# Patient Record
Sex: MALE | Race: WHITE | NOT HISPANIC OR LATINO
[De-identification: names, ages, dates, MRNs, and addresses within clinical notes are randomized per-mention and may not be internally consistent; named-entity substitution may affect disease eponyms.]

---

## 2017-01-18 ENCOUNTER — APPOINTMENT (OUTPATIENT)
Dept: SPINE | Facility: CLINIC | Age: 62
End: 2017-01-18

## 2017-01-18 VITALS
DIASTOLIC BLOOD PRESSURE: 90 MMHG | WEIGHT: 160 LBS | HEIGHT: 66 IN | SYSTOLIC BLOOD PRESSURE: 145 MMHG | BODY MASS INDEX: 25.71 KG/M2 | HEART RATE: 57 BPM

## 2017-01-18 DIAGNOSIS — Z87.891 PERSONAL HISTORY OF NICOTINE DEPENDENCE: ICD-10-CM

## 2017-01-18 DIAGNOSIS — Z78.9 OTHER SPECIFIED HEALTH STATUS: ICD-10-CM

## 2017-01-18 DIAGNOSIS — M51.26 OTHER INTERVERTEBRAL DISC DISPLACEMENT, LUMBAR REGION: ICD-10-CM

## 2017-01-18 DIAGNOSIS — Z85.9 PERSONAL HISTORY OF MALIGNANT NEOPLASM, UNSPECIFIED: ICD-10-CM

## 2017-01-18 RX ORDER — ALPRAZOLAM 1 MG/1
1 TABLET ORAL
Refills: 0 | Status: ACTIVE | COMMUNITY

## 2017-01-26 ENCOUNTER — TRANSCRIPTION ENCOUNTER (OUTPATIENT)
Age: 62
End: 2017-01-26

## 2017-11-28 ENCOUNTER — TRANSCRIPTION ENCOUNTER (OUTPATIENT)
Age: 62
End: 2017-11-28

## 2017-11-30 ENCOUNTER — TRANSCRIPTION ENCOUNTER (OUTPATIENT)
Age: 62
End: 2017-11-30

## 2017-12-05 ENCOUNTER — APPOINTMENT (OUTPATIENT)
Dept: OTOLARYNGOLOGY | Facility: CLINIC | Age: 62
End: 2017-12-05
Payer: COMMERCIAL

## 2017-12-05 VITALS
HEART RATE: 82 BPM | WEIGHT: 157 LBS | BODY MASS INDEX: 25.23 KG/M2 | DIASTOLIC BLOOD PRESSURE: 87 MMHG | HEIGHT: 66 IN | SYSTOLIC BLOOD PRESSURE: 148 MMHG

## 2017-12-05 DIAGNOSIS — Z85.09 PERSONAL HISTORY OF MALIGNANT NEOPLASM OF OTHER DIGESTIVE ORGANS: ICD-10-CM

## 2017-12-05 PROCEDURE — 99213 OFFICE O/P EST LOW 20 MIN: CPT

## 2017-12-05 RX ORDER — DICLOFENAC SODIUM 75 MG/1
75 TABLET, DELAYED RELEASE ORAL
Qty: 60 | Refills: 0 | Status: ACTIVE | COMMUNITY
Start: 2017-08-09

## 2017-12-05 RX ORDER — AZITHROMYCIN 250 MG/1
250 TABLET, FILM COATED ORAL
Qty: 6 | Refills: 0 | Status: DISCONTINUED | COMMUNITY
Start: 2017-11-29

## 2017-12-05 RX ORDER — OMEPRAZOLE 10 MG/1
10 CAPSULE, DELAYED RELEASE ORAL
Refills: 0 | Status: DISCONTINUED | COMMUNITY
End: 2017-12-05

## 2017-12-05 RX ORDER — VORTIOXETINE 5 MG/1
5 TABLET, FILM COATED ORAL
Qty: 30 | Refills: 0 | Status: ACTIVE | COMMUNITY
Start: 2017-12-01

## 2018-02-24 ENCOUNTER — TRANSCRIPTION ENCOUNTER (OUTPATIENT)
Age: 63
End: 2018-02-24

## 2018-06-17 ENCOUNTER — TRANSCRIPTION ENCOUNTER (OUTPATIENT)
Age: 63
End: 2018-06-17

## 2018-07-02 ENCOUNTER — TRANSCRIPTION ENCOUNTER (OUTPATIENT)
Age: 63
End: 2018-07-02

## 2018-07-22 ENCOUNTER — TRANSCRIPTION ENCOUNTER (OUTPATIENT)
Age: 63
End: 2018-07-22

## 2018-07-24 ENCOUNTER — NON-APPOINTMENT (OUTPATIENT)
Age: 63
End: 2018-07-24

## 2018-07-24 ENCOUNTER — APPOINTMENT (OUTPATIENT)
Dept: PULMONOLOGY | Facility: CLINIC | Age: 63
End: 2018-07-24
Payer: COMMERCIAL

## 2018-07-24 VITALS
SYSTOLIC BLOOD PRESSURE: 130 MMHG | DIASTOLIC BLOOD PRESSURE: 80 MMHG | WEIGHT: 171 LBS | HEART RATE: 62 BPM | OXYGEN SATURATION: 97 % | BODY MASS INDEX: 27.48 KG/M2 | HEIGHT: 66 IN

## 2018-07-24 DIAGNOSIS — Z82.49 FAMILY HISTORY OF ISCHEMIC HEART DISEASE AND OTHER DISEASES OF THE CIRCULATORY SYSTEM: ICD-10-CM

## 2018-07-24 DIAGNOSIS — Z86.79 PERSONAL HISTORY OF OTHER DISEASES OF THE CIRCULATORY SYSTEM: ICD-10-CM

## 2018-07-24 DIAGNOSIS — J94.2 HEMOTHORAX: ICD-10-CM

## 2018-07-24 DIAGNOSIS — Z78.9 OTHER SPECIFIED HEALTH STATUS: ICD-10-CM

## 2018-07-24 DIAGNOSIS — Z63.4 DISAPPEARANCE AND DEATH OF FAMILY MEMBER: ICD-10-CM

## 2018-07-24 DIAGNOSIS — J30.2 OTHER SEASONAL ALLERGIC RHINITIS: ICD-10-CM

## 2018-07-24 DIAGNOSIS — Z83.6 FAMILY HISTORY OF OTHER DISEASES OF THE RESPIRATORY SYSTEM: ICD-10-CM

## 2018-07-24 DIAGNOSIS — F41.9 ANXIETY DISORDER, UNSPECIFIED: ICD-10-CM

## 2018-07-24 DIAGNOSIS — F32.9 ANXIETY DISORDER, UNSPECIFIED: ICD-10-CM

## 2018-07-24 DIAGNOSIS — Z87.19 PERSONAL HISTORY OF OTHER DISEASES OF THE DIGESTIVE SYSTEM: ICD-10-CM

## 2018-07-24 PROCEDURE — 94010 BREATHING CAPACITY TEST: CPT | Mod: 59

## 2018-07-24 PROCEDURE — 71046 X-RAY EXAM CHEST 2 VIEWS: CPT

## 2018-07-24 PROCEDURE — 94618 PULMONARY STRESS TESTING: CPT

## 2018-07-24 PROCEDURE — 99204 OFFICE O/P NEW MOD 45 MIN: CPT | Mod: 25

## 2018-07-24 RX ORDER — OXYCODONE AND ACETAMINOPHEN 5; 325 MG/1; MG/1
5-325 TABLET ORAL
Qty: 30 | Refills: 0 | Status: ACTIVE | COMMUNITY
Start: 2018-06-19

## 2018-07-24 RX ORDER — AMLODIPINE BESYLATE 5 MG/1
5 TABLET ORAL
Qty: 15 | Refills: 0 | Status: ACTIVE | COMMUNITY
Start: 2018-02-19

## 2018-07-24 RX ORDER — SODIUM SULFATE, POTASSIUM SULFATE, MAGNESIUM SULFATE 17.5; 3.13; 1.6 G/ML; G/ML; G/ML
17.5-3.13-1.6 SOLUTION, CONCENTRATE ORAL
Qty: 354 | Refills: 0 | Status: DISCONTINUED | COMMUNITY
Start: 2018-05-17

## 2018-07-24 RX ORDER — OSELTAMIVIR PHOSPHATE 75 MG/1
75 CAPSULE ORAL
Qty: 10 | Refills: 0 | Status: DISCONTINUED | COMMUNITY
Start: 2018-02-19

## 2018-07-24 RX ORDER — OMEPRAZOLE 20 MG/1
20 CAPSULE, DELAYED RELEASE ORAL
Qty: 30 | Refills: 0 | Status: ACTIVE | COMMUNITY
Start: 2018-05-17

## 2018-07-24 RX ORDER — ONDANSETRON 4 MG/1
4 TABLET ORAL
Qty: 30 | Refills: 0 | Status: ACTIVE | COMMUNITY
Start: 2018-07-12

## 2018-07-24 SDOH — SOCIAL STABILITY - SOCIAL INSECURITY: DISSAPEARANCE AND DEATH OF FAMILY MEMBER: Z63.4

## 2018-07-26 PROBLEM — Z78.9 ALCOHOL USE: Status: ACTIVE | Noted: 2017-01-18

## 2018-09-11 ENCOUNTER — APPOINTMENT (OUTPATIENT)
Dept: PULMONOLOGY | Facility: CLINIC | Age: 63
End: 2018-09-11
Payer: COMMERCIAL

## 2018-09-11 VITALS
HEIGHT: 64 IN | RESPIRATION RATE: 17 BRPM | SYSTOLIC BLOOD PRESSURE: 126 MMHG | DIASTOLIC BLOOD PRESSURE: 80 MMHG | BODY MASS INDEX: 29.88 KG/M2 | WEIGHT: 175 LBS | OXYGEN SATURATION: 98 % | HEART RATE: 70 BPM

## 2018-09-11 DIAGNOSIS — J45.901 ACUTE BRONCHITIS, UNSPECIFIED: ICD-10-CM

## 2018-09-11 DIAGNOSIS — J20.9 ACUTE BRONCHITIS, UNSPECIFIED: ICD-10-CM

## 2018-09-11 PROCEDURE — 99214 OFFICE O/P EST MOD 30 MIN: CPT | Mod: 25

## 2018-09-11 PROCEDURE — 94727 GAS DIL/WSHOT DETER LNG VOL: CPT

## 2018-09-11 PROCEDURE — 95012 NITRIC OXIDE EXP GAS DETER: CPT

## 2018-09-11 PROCEDURE — 94010 BREATHING CAPACITY TEST: CPT

## 2018-09-11 PROCEDURE — 94729 DIFFUSING CAPACITY: CPT

## 2018-09-11 RX ORDER — AZITHROMYCIN 500 MG/1
500 TABLET, FILM COATED ORAL DAILY
Qty: 5 | Refills: 0 | Status: DISCONTINUED | COMMUNITY
Start: 2018-07-24 | End: 2018-09-11

## 2018-09-11 RX ORDER — AMOXICILLIN AND CLAVULANATE POTASSIUM 500; 125 MG/1; MG/1
500-125 TABLET, FILM COATED ORAL
Qty: 30 | Refills: 0 | Status: DISCONTINUED | COMMUNITY
Start: 2018-06-19 | End: 2018-09-11

## 2018-09-11 RX ORDER — AMOXICILLIN AND CLAVULANATE POTASSIUM 875; 125 MG/1; MG/1
875-125 TABLET, COATED ORAL
Qty: 60 | Refills: 0 | Status: DISCONTINUED | COMMUNITY
Start: 2018-06-28 | End: 2018-09-11

## 2018-09-11 RX ORDER — METHYLPREDNISOLONE 4 MG/1
4 TABLET ORAL
Qty: 21 | Refills: 0 | Status: DISCONTINUED | COMMUNITY
Start: 2018-07-22 | End: 2018-09-11

## 2018-09-11 RX ORDER — PREDNISONE 10 MG/1
10 TABLET ORAL
Qty: 100 | Refills: 0 | Status: DISCONTINUED | COMMUNITY
Start: 2018-07-24 | End: 2018-09-11

## 2018-09-11 NOTE — PHYSICAL EXAM
[General Appearance - Well Developed] : well developed [Normal Appearance] : normal appearance [Well Groomed] : well groomed [General Appearance - Well Nourished] : well nourished [No Deformities] : no deformities [General Appearance - In No Acute Distress] : no acute distress [Normal Conjunctiva] : the conjunctiva exhibited no abnormalities [Eyelids - No Xanthelasma] : the eyelids demonstrated no xanthelasmas [Normal Oropharynx] : normal oropharynx [II] : II [Neck Appearance] : the appearance of the neck was normal [Neck Cervical Mass (___cm)] : no neck mass was observed [Jugular Venous Distention Increased] : there was no jugular-venous distention [Thyroid Diffuse Enlargement] : the thyroid was not enlarged [Thyroid Nodule] : there were no palpable thyroid nodules [Heart Rate And Rhythm] : heart rate and rhythm were normal [Heart Sounds] : normal S1 and S2 [Murmurs] : no murmurs present [Respiration, Rhythm And Depth] : normal respiratory rhythm and effort [Exaggerated Use Of Accessory Muscles For Inspiration] : no accessory muscle use [Auscultation Breath Sounds / Voice Sounds] : lungs were clear to auscultation bilaterally [Abdomen Soft] : soft [Abdomen Tenderness] : non-tender [Abdomen Mass (___ Cm)] : no abdominal mass palpated [Abnormal Walk] : normal gait [Gait - Sufficient For Exercise Testing] : the gait was sufficient for exercise testing [Nail Clubbing] : no clubbing of the fingernails [Cyanosis, Localized] : no localized cyanosis [Petechial Hemorrhages (___cm)] : no petechial hemorrhages [Deep Tendon Reflexes (DTR)] : deep tendon reflexes were 2+ and symmetric [Sensation] : the sensory exam was normal to light touch and pinprick [No Focal Deficits] : no focal deficits [Oriented To Time, Place, And Person] : oriented to person, place, and time [Impaired Insight] : insight and judgment were intact [Affect] : the affect was normal [Skin Color & Pigmentation] : normal skin color and pigmentation [Skin Turgor] : normal skin turgor [] : no rash [FreeTextEntry1] : I:E 1:3, clear

## 2018-09-11 NOTE — REASON FOR VISIT
[Follow-Up] : a follow-up visit [FreeTextEntry1] : allergic rhinitis, cough, GERD, snoring, and SOB.

## 2018-09-11 NOTE — ADDENDUM
[FreeTextEntry1] : Documented by Kt Gu acting as a scribe for Dr. Pato Garcia on 9/11/18\par \par All medical record entries made by the Scribe were at my, Dr. Pato Garcia's, direction and personally dictated by me on 9/11/18. I have reviewed the chart and agree that the record accurately reflects my personal performance of the history, physical exam, assessment and plan. I have also personally directed, reviewed, and agree with the discharge instructions. \par \par \par \par \par

## 2018-09-11 NOTE — ASSESSMENT
[FreeTextEntry1] : Mr. Platt is a 63 year old male with a prior history of asthma, allergy, GERD, Casiano's esophagus, and ? OSAS, who now comes in for a pulmonary reevaluation. \par \par The patient's SOB is felt to be multifactorial:\par - Overweight\par - Out-of Shape\par - Poor breathing mechanics\par - Asthma/ Allergy\par \par Problem 1: Asthma \par - Continue Advair Diskus 250 1 inhalation BID\par - Continue rescue inhaler: Ventolin 2 puffs up to QID\par - Continue treatment of albuterol 1 unit does via a nebulizer up to TID-- He was given a nebulizer treatment today 7/24/2018\par Asthma is believed to be caused by inherited (genetic) and environmental factor, but its exact cause is unknown. Asthma may be triggered by allergens, lung infections, or irritants in the air. Asthma triggers are different for each person \par -Inhaler technique reviewed as well as oral hygiene techniques reviewed with patient. Avoidance of cold air, extremes of temperature, rescue inhaler should be used before exercise. Order of medication reviewed with patient. Recommended use of a cool mist humidifier in the bedroom. \par \par Problem 2: Allergic Rhinitis \par - Continue olopatadine .6% 1 sniff/nostril BID\par - Recommended Navage Nasal Saline \par - Blood work to include: asthma profile, food IgE panel, eosinophil level, IgE level, Vitamin D level \par - Environmental measures for allergies were encouraged including mattress and pillow cover, air purifier, and environmental controls.\par \par Problem 3: Reflux/ GERD\par - Add Omeprazole 40 mg before breakfast\par - Things to avoid including overeating, spicy foods, tight clothing, eating within three hours of bed, this list is not all inclusive. \par -For treatment of reflux, possible options discussed including diet control, H2 blockers, PPIs, as well as coating motility agents discussed as treatment options. Timing of meals and proximity of last meal to sleep were discussed. If symptoms persist, a formal gastrointestinal evaluation is needed.\par \par Problem 4: r/o NORMA\par - Based on his mallampatti class, EDS, and Casiano's esophagus, he is recommended to have a home sleep study to r/o for NORMA\par - Blood work to include thyroid function test and Free and Total Testosterone Test\par Sleep apnea is associated with adverse clinical consequences which an affect most organ systems. Cardiovascular disease risk includes arrhythmias, atrial fibrillation, hypertension, coronary artery disease, and stroke. Metabolic disorders include diabetes type 2, non-alcoholic fatty liver disease. Mood disorder especially depression; and cognitive decline especially in the elderly. Associations with chronic reflux/Casiano’s esophagus some but not all inclusive. \par -Reasons include arousal consistent with hypopnea; respiratory events most prominent in REM sleep or supine position; therefore sleep staging and body position are important for accurate diagnosis and estimation of AHI. \par \par Problem 5: Cardiac \par - follow up with cardiologist \par \par Problem 6: Poor Breathing Mechanics \par - Proper breathing techniques were reviewed with an emphasis of exhalation. Patient instructed to breath in for 1 second and out for four seconds. Patient was encouraged to not talk while walking. \par \par Problem 7: Overweight\par - Weight loss, exercise, and diet control were discussed and are highly encouraged. Treatment options were given such as, aqua therapy, and contacting a nutritionist. Recommended to use the elliptical, stationary bike, less use of treadmill. Mindful eating was explained to the patient Obesity is associated with worsening asthma, shortness of breath, and potential for cardiac disease, diabetes, and other underlying medical conditions. \par \par  Problem 8: Health maintenance \par -s/p flu shot \par -recommended strep pneumonia vaccines: Prevnar-13 vaccine, followed by Pneumo vaccine 23 one year following\par -recommended early intervention for URIs\par -recommended regular osteoporosis evaluations\par -recommended early dermatological evaluations\par -recommended after the age of 50 to the age of 70, colonoscopy every 5 years \par \par f/u in 3-4 months \par pt is encouraged to call or fax the office with any questions or concerns. \par Explained to the pt in full detail with demonstrations how to use the inhalers and inhaler hygiene. \par -Education provided to the PT regarding their visit and conditions.

## 2018-09-11 NOTE — PROCEDURE
[FreeTextEntry1] : PFT- spi reveals normal flows; FEV1 was  2.56L which is 104% of predicted; normal lung volumes; normal diffusion at 18.7, which is 92% of predicted; normal flow volume loop \par \par FENO was 15; a normal value being less than 25\par Fractional exhaled nitric oxide (FENO) is regarded as a simple, noninvasive method for assessing eosinophilic airway inflammation. Produced by a variety of cells within the lung, nitric oxide (NO) concentrations are generally low in healthy individuals. However, high concentrations of NO appear to be involved in nonspecific host defense mechanisms and chronic inflammatory diseases such as asthma. The American Thoracic Society (ATS) therefore has recommended using FENO to aid in the diagnosis and monitoring of eosinophilic airway inflammation and asthma, and for identifying steroid responsive individuals whose chronic respiratory symptoms may be caused by airway inflammation. \par \par \par

## 2018-09-11 NOTE — HISTORY OF PRESENT ILLNESS
[FreeTextEntry1] : Mr. Platt is a 63 year old male coming into the office today for a follow up visit with a history of allergic rhinitis, cough, GERD, snoring, and SOB. His chief complaint is some nasal congestion\par - He has general headaches that he does not believe to be related to his sinuses.\par - His daughter has told him that he snores very loudly.\par - He states that he is getting up in the middle of the night. \par - His has gained some weight.\par - He continues to experiences acid reflux once per month due to his Casiano's \par - he recently began exercising again by doing push ups. \par - While he does not note any significant sinus issues, he has been experiencing some right-sided congestion, which he believes is caused by mold in his bathroom. \par - He denies any nausea, vomiting, fever, chills, sweats, chest pain, chest pressure, palpitations, SOB, coughing, wheezing, fatigue, diarrhea, constipation, dysphagia, myalgias, dizziness, leg swelling, leg pain, itchy eyes, itchy ears, heartburn, reflux, or sour taste in the mouth.

## 2018-09-25 ENCOUNTER — CLINICAL ADVICE (OUTPATIENT)
Age: 63
End: 2018-09-25

## 2019-01-07 ENCOUNTER — TRANSCRIPTION ENCOUNTER (OUTPATIENT)
Age: 64
End: 2019-01-07

## 2019-01-11 ENCOUNTER — NON-APPOINTMENT (OUTPATIENT)
Age: 64
End: 2019-01-11

## 2019-01-11 ENCOUNTER — APPOINTMENT (OUTPATIENT)
Dept: PULMONOLOGY | Facility: CLINIC | Age: 64
End: 2019-01-11
Payer: COMMERCIAL

## 2019-01-11 VITALS
RESPIRATION RATE: 17 BRPM | BODY MASS INDEX: 28.32 KG/M2 | TEMPERATURE: 98 F | SYSTOLIC BLOOD PRESSURE: 128 MMHG | WEIGHT: 170 LBS | OXYGEN SATURATION: 97 % | DIASTOLIC BLOOD PRESSURE: 64 MMHG | HEART RATE: 64 BPM | HEIGHT: 65 IN

## 2019-01-11 DIAGNOSIS — Z23 ENCOUNTER FOR IMMUNIZATION: ICD-10-CM

## 2019-01-11 PROCEDURE — 94010 BREATHING CAPACITY TEST: CPT

## 2019-01-11 PROCEDURE — 95012 NITRIC OXIDE EXP GAS DETER: CPT

## 2019-01-11 PROCEDURE — 99214 OFFICE O/P EST MOD 30 MIN: CPT | Mod: 25

## 2019-01-11 NOTE — ASSESSMENT
[FreeTextEntry1] : Mr. Platt is a 63 year old male with a prior history of asthma, allergy, GERD, Casiano's esophagus, and ? OSAS, who now comes in for a pulmonary reevaluation. His number one issue is poor sleep. \par \par The patient's SOB is felt to be multifactorial:\par - Overweight\par - Out-of Shape\par - Poor breathing mechanics\par - Asthma/ Allergy\par \par Problem 1: Asthma \par - Continue Advair Diskus 250 1 inhalation BID\par - Continue rescue inhaler: Ventolin 2 puffs up to QID\par - Continue treatment of albuterol 1 unit does via a nebulizer up to TID\par Asthma is believed to be caused by inherited (genetic) and environmental factor, but its exact cause is unknown. Asthma may be triggered by allergens, lung infections, or irritants in the air. Asthma triggers are different for each person \par -Inhaler technique reviewed as well as oral hygiene techniques reviewed with patient. Avoidance of cold air, extremes of temperature, rescue inhaler should be used before exercise. Order of medication reviewed with patient. Recommended use of a cool mist humidifier in the bedroom. \par \par Problem 2: Allergic Rhinitis \par - Continue olopatadine .6% 1 sniff/nostril BID\par - Recommended Navage Nasal Saline \par - Blood work to include: asthma profile, food IgE panel, eosinophil level, IgE level, Vitamin D level \par - Environmental measures for allergies were encouraged including mattress and pillow cover, air purifier, and environmental controls.\par \par Problem 3: Reflux/ GERD\par - Continue Omeprazole 40 mg before breakfast\par - Things to avoid including overeating, spicy foods, tight clothing, eating within three hours of bed, this list is not all inclusive. \par -For treatment of reflux, possible options discussed including diet control, H2 blockers, PPIs, as well as coating motility agents discussed as treatment options. Timing of meals and proximity of last meal to sleep were discussed. If symptoms persist, a formal gastrointestinal evaluation is needed.\par \par Problem 4: r/o NORMA\par - Based on his mallampatti class, EDS, and Casiano's esophagus, he is recommended to have a home sleep study to r/o for NORMA\par - Blood work to include thyroid function test and Free and Total Testosterone Test\par Sleep apnea is associated with adverse clinical consequences which an affect most organ systems. Cardiovascular disease risk includes arrhythmias, atrial fibrillation, hypertension, coronary artery disease, and stroke. Metabolic disorders include diabetes type 2, non-alcoholic fatty liver disease. Mood disorder especially depression; and cognitive decline especially in the elderly. Associations with chronic reflux/Casiano’s esophagus some but not all inclusive. \par -Reasons include arousal consistent with hypopnea; respiratory events most prominent in REM sleep or supine position; therefore sleep staging and body position are important for accurate diagnosis and estimation of AHI. \par \par Problem 5: Cardiac \par - follow up with cardiologist \par \par Problem 6: Poor Breathing Mechanics \par - Proper breathing techniques were reviewed with an emphasis of exhalation. Patient instructed to breath in for 1 second and out for four seconds. Patient was encouraged to not talk while walking. \par \par Problem 7: Overweight\par - Weight loss, exercise, and diet control were discussed and are highly encouraged. Treatment options were given such as, aqua therapy, and contacting a nutritionist. Recommended to use the elliptical, stationary bike, less use of treadmill. Mindful eating was explained to the patient Obesity is associated with worsening asthma, shortness of breath, and potential for cardiac disease, diabetes, and other underlying medical conditions. \par \par  Problem 8: Health maintenance \par - Administered an influenza vaccine today 1/11/2019\par -recommended strep pneumonia vaccines: Prevnar-13 vaccine, followed by Pneumo vaccine 23 one year following\par -recommended early intervention for URIs\par -recommended regular osteoporosis evaluations\par -recommended early dermatological evaluations\par -recommended after the age of 50 to the age of 70, colonoscopy every 5 years \par \par f/u in 3-4 months \par pt is encouraged to call or fax the office with any questions or concerns. \par Explained to the pt in full detail with demonstrations how to use the inhalers and inhaler hygiene. \par -Education provided to the PT regarding their visit and conditions.

## 2019-01-11 NOTE — PHYSICAL EXAM
[General Appearance - Well Developed] : well developed [Normal Appearance] : normal appearance [Well Groomed] : well groomed [General Appearance - Well Nourished] : well nourished [No Deformities] : no deformities [General Appearance - In No Acute Distress] : no acute distress [Normal Conjunctiva] : the conjunctiva exhibited no abnormalities [Eyelids - No Xanthelasma] : the eyelids demonstrated no xanthelasmas [Normal Oropharynx] : normal oropharynx [II] : II [Neck Appearance] : the appearance of the neck was normal [Neck Cervical Mass (___cm)] : no neck mass was observed [Jugular Venous Distention Increased] : there was no jugular-venous distention [Thyroid Diffuse Enlargement] : the thyroid was not enlarged [Thyroid Nodule] : there were no palpable thyroid nodules [Heart Rate And Rhythm] : heart rate and rhythm were normal [Heart Sounds] : normal S1 and S2 [Respiration, Rhythm And Depth] : normal respiratory rhythm and effort [Exaggerated Use Of Accessory Muscles For Inspiration] : no accessory muscle use [Auscultation Breath Sounds / Voice Sounds] : lungs were clear to auscultation bilaterally [Abdomen Soft] : soft [Abdomen Tenderness] : non-tender [Abdomen Mass (___ Cm)] : no abdominal mass palpated [Abnormal Walk] : normal gait [Gait - Sufficient For Exercise Testing] : the gait was sufficient for exercise testing [Nail Clubbing] : no clubbing of the fingernails [Cyanosis, Localized] : no localized cyanosis [Petechial Hemorrhages (___cm)] : no petechial hemorrhages [Deep Tendon Reflexes (DTR)] : deep tendon reflexes were 2+ and symmetric [Sensation] : the sensory exam was normal to light touch and pinprick [No Focal Deficits] : no focal deficits [Oriented To Time, Place, And Person] : oriented to person, place, and time [Impaired Insight] : insight and judgment were intact [Affect] : the affect was normal [Skin Color & Pigmentation] : normal skin color and pigmentation [Skin Turgor] : normal skin turgor [] : no rash [FreeTextEntry1] : I:E 1:3, clear

## 2019-01-11 NOTE — REASON FOR VISIT
[Follow-Up] : a follow-up visit [FreeTextEntry1] : allergic rhinitis, asthma, GERD, snoring, and SOB.

## 2019-01-11 NOTE — HISTORY OF PRESENT ILLNESS
[FreeTextEntry1] : Mr. Platt is a 63 year old male coming into the office today for a follow up visit with a history of allergic rhinitis, cough, GERD, snoring, and SOB. His chief complaint is poor sleep. \par - He comes in stating that he was ill the previous week with a virus\par - He woke up in the morning with severe GI pain. \par - He is currently improved\par - he denies constipation but notes that he has hard stool\par - He continues to have heartburn, reflux. \par - His sleep continues to be an issue. He feels that it is psychological. \par - He has recently decided to stop drinking Lawrence with half a pill of Xanax and has transitioned to drinking sleepy time tea with a full pill of Xanax. \par - He reports rhinorrhea\par - His right sinus always is congested completely. \par - He has requested a new prescription for a sleep apnea \par - His weight is stable\par - He denies any headaches, nausea, vomiting, fever, chills, sweats, chest pain, chest pressure, palpitations, SOB, coughing, wheezing, diarrhea, dysphagia, myalgias, dizziness, leg swelling, leg pain, itchy eyes, itchy ears, or sour taste in the mouth. \par

## 2019-01-11 NOTE — PROCEDURE
[FreeTextEntry1] : PFT- spi reveals normal flows; FEV1 was 2.29L which is 80% of predicted; normal flow volume loop \par \par FENO was 12; a normal value being less than 25\par Fractional exhaled nitric oxide (FENO) is regarded as a simple, noninvasive method for assessing eosinophilic airway inflammation. Produced by a variety of cells within the lung, nitric oxide (NO) concentrations are generally low in healthy individuals. However, high concentrations of NO appear to be involved in nonspecific host defense mechanisms and chronic inflammatory diseases such as asthma. The American Thoracic Society (ATS) therefore has recommended using FENO to aid in the diagnosis and monitoring of eosinophilic airway inflammation and asthma, and for identifying steroid responsive individuals whose chronic respiratory symptoms may be caused by airway inflammation.

## 2019-01-11 NOTE — ADDENDUM
[FreeTextEntry1] : Documented by Kt Gu acting as a scribe for Dr. Pato Garcia on 1/11/2019\par \par All medical record entries made by the Scribe were at my, Dr. Pato Garcia's, direction and personally dictated by me on 1/11/2019. I have reviewed the chart and agree that the record accurately reflects my personal performance of the history, physical exam, assessment and plan. I have also personally directed, reviewed, and agree with the discharge instructions. \par \par \par \par \par

## 2019-01-15 ENCOUNTER — MED ADMIN CHARGE (OUTPATIENT)
Age: 64
End: 2019-01-15

## 2019-01-15 PROBLEM — Z23 ENCOUNTER FOR IMMUNIZATION: Status: ACTIVE | Noted: 2019-01-15

## 2019-02-13 ENCOUNTER — APPOINTMENT (OUTPATIENT)
Dept: SLEEP CENTER | Facility: CLINIC | Age: 64
End: 2019-02-13

## 2019-05-13 ENCOUNTER — NON-APPOINTMENT (OUTPATIENT)
Age: 64
End: 2019-05-13

## 2019-05-13 ENCOUNTER — APPOINTMENT (OUTPATIENT)
Dept: PULMONOLOGY | Facility: CLINIC | Age: 64
End: 2019-05-13
Payer: MEDICARE

## 2019-05-13 VITALS
BODY MASS INDEX: 25.49 KG/M2 | HEART RATE: 66 BPM | SYSTOLIC BLOOD PRESSURE: 120 MMHG | HEIGHT: 65 IN | RESPIRATION RATE: 17 BRPM | WEIGHT: 153 LBS | OXYGEN SATURATION: 100 % | DIASTOLIC BLOOD PRESSURE: 70 MMHG

## 2019-05-13 PROCEDURE — 99214 OFFICE O/P EST MOD 30 MIN: CPT | Mod: 25

## 2019-05-13 PROCEDURE — 94010 BREATHING CAPACITY TEST: CPT

## 2019-05-13 RX ORDER — OLOPATADINE HYDROCHLORIDE 2 MG/ML
0.2 SOLUTION OPHTHALMIC
Qty: 3 | Refills: 1 | Status: ACTIVE | COMMUNITY
Start: 2019-05-13 | End: 1900-01-01

## 2019-05-13 RX ORDER — CEFUROXIME AXETIL 500 MG/1
500 TABLET ORAL
Qty: 20 | Refills: 0 | Status: DISCONTINUED | COMMUNITY
Start: 2018-06-17 | End: 2019-05-13

## 2019-05-13 NOTE — PHYSICAL EXAM
[General Appearance - Well Developed] : well developed [Well Groomed] : well groomed [General Appearance - Well Nourished] : well nourished [No Deformities] : no deformities [General Appearance - In No Acute Distress] : no acute distress [Normal Conjunctiva] : the conjunctiva exhibited no abnormalities [Eyelids - No Xanthelasma] : the eyelids demonstrated no xanthelasmas [Normal Oropharynx] : normal oropharynx [II] : II [Neck Appearance] : the appearance of the neck was normal [Neck Cervical Mass (___cm)] : no neck mass was observed [Jugular Venous Distention Increased] : there was no jugular-venous distention [Thyroid Diffuse Enlargement] : the thyroid was not enlarged [Thyroid Nodule] : there were no palpable thyroid nodules [Heart Rate And Rhythm] : heart rate and rhythm were normal [Heart Sounds] : normal S1 and S2 [Respiration, Rhythm And Depth] : normal respiratory rhythm and effort [Exaggerated Use Of Accessory Muscles For Inspiration] : no accessory muscle use [Auscultation Breath Sounds / Voice Sounds] : lungs were clear to auscultation bilaterally [Abdomen Soft] : soft [Abdomen Tenderness] : non-tender [Abdomen Mass (___ Cm)] : no abdominal mass palpated [Abnormal Walk] : normal gait [Gait - Sufficient For Exercise Testing] : the gait was sufficient for exercise testing [Nail Clubbing] : no clubbing of the fingernails [Cyanosis, Localized] : no localized cyanosis [Petechial Hemorrhages (___cm)] : no petechial hemorrhages [] : no ischemic changes [Deep Tendon Reflexes (DTR)] : deep tendon reflexes were 2+ and symmetric [Sensation] : the sensory exam was normal to light touch and pinprick [No Focal Deficits] : no focal deficits [Oriented To Time, Place, And Person] : oriented to person, place, and time [Impaired Insight] : insight and judgment were intact [Affect] : the affect was normal [FreeTextEntry1] : I:E 1:3, clear

## 2019-05-13 NOTE — PROCEDURE
[FreeTextEntry1] : PFT- spi reveals normal flows; FEV1 was 2.95L which is 104% of predicted; normal flow volume loop

## 2019-05-13 NOTE — HISTORY OF PRESENT ILLNESS
[FreeTextEntry1] : Mr. Platt is a 64 year old male coming into the office today for a follow up visit with a history of allergic rhinitis, cough, GERD, snoring, and SOB. His chief complaint is allergies. \par - He comes in having lost 14 pounds since 1/2019 and feeling generally well. \par - He has removed alcohol from his diet during the week. \par - He generally has not had any heartburn or reflux, but reports occasional incidents. \par - he has been having a lot of dry mouth second to Allegra D 12 hours, which he takes for allergies\par - He has been having more frequent BM, which he believes is due to his new, healthier diet. \par - He has been doing relatively well with anti-depression medication as well as anti-anxiety for sleep. \par - His bowels are regular \par - He  denies any headaches, nausea, vomiting, fever, chills, sweats, chest pain, chest pressure, palpitations, SOB, coughing, wheezing, fatigue, diarrhea, constipation, dysphagia, myalgias, dizziness, leg swelling, leg pain, itchy eyes, itchy ears, or sour taste in the mouth.

## 2019-05-13 NOTE — ASSESSMENT
[FreeTextEntry1] : Mr. Platt is a 63 year old male with a prior history of tongue cancer, asthma, allergy, GERD, Casiano's esophagus, and ? OSAS, who now comes in for a pulmonary reevaluation. His number one issue is poor sleep, though he is overall improved. \par \par The patient's SOB is felt to be multifactorial:\par - Overweight\par - Out-of Shape\par - Poor breathing mechanics\par - Asthma/ Allergy\par \par Problem 1: Asthma \par - Continue Advair Diskus 250 1 inhalation BID\par - Continue rescue inhaler: Ventolin 2 puffs up to QID\par - Continue treatment of albuterol 1 unit does via a nebulizer up to TID\par Asthma is believed to be caused by inherited (genetic) and environmental factor, but its exact cause is unknown. Asthma may be triggered by allergens, lung infections, or irritants in the air. Asthma triggers are different for each person \par -Inhaler technique reviewed as well as oral hygiene techniques reviewed with patient. Avoidance of cold air, extremes of temperature, rescue inhaler should be used before exercise. Order of medication reviewed with patient. Recommended use of a cool mist humidifier in the bedroom. \par \par Problem 2: Allergic Rhinitis \par - Continue olopatadine .6% 1 sniff/nostril BID\par - Recommended Navage Nasal Saline \par - Blood work to include: asthma profile, food IgE panel, eosinophil level, IgE level, Vitamin D level \par - Environmental measures for allergies were encouraged including mattress and pillow cover, air purifier, and environmental controls.\par \par Problem 3: Reflux/ GERD\par - Continue Omeprazole 40 mg before breakfast\par - Things to avoid including overeating, spicy foods, tight clothing, eating within three hours of bed, this list is not all inclusive. \par -For treatment of reflux, possible options discussed including diet control, H2 blockers, PPIs, as well as coating motility agents discussed as treatment options. Timing of meals and proximity of last meal to sleep were discussed. If symptoms persist, a formal gastrointestinal evaluation is needed.\par \par Problem 4: r/o NORMA\par - Based on his mallampatti class, EDS, and Casiano's esophagus, he is recommended to have a home sleep study to r/o for NORMA\par - Blood work to include thyroid function test and Free and Total Testosterone Test\par Sleep apnea is associated with adverse clinical consequences which an affect most organ systems. Cardiovascular disease risk includes arrhythmias, atrial fibrillation, hypertension, coronary artery disease, and stroke. Metabolic disorders include diabetes type 2, non-alcoholic fatty liver disease. Mood disorder especially depression; and cognitive decline especially in the elderly. Associations with chronic reflux/Casiano’s esophagus some but not all inclusive. \par -Reasons include arousal consistent with hypopnea; respiratory events most prominent in REM sleep or supine position; therefore sleep staging and body position are important for accurate diagnosis and estimation of AHI. \par \par Problem 5: Cardiac \par - follow up with cardiologist \par \par Problem 6: Poor Breathing Mechanics \par - Proper breathing techniques were reviewed with an emphasis of exhalation. Patient instructed to breath in for 1 second and out for four seconds. Patient was encouraged to not talk while walking. \par \par Problem 7: Overweight (improved)\par - Diet improved \par - Weight loss, exercise, and diet control were discussed and are highly encouraged. Treatment options were given such as, aqua therapy, and contacting a nutritionist. Recommended to use the elliptical, stationary bike, less use of treadmill. Mindful eating was explained to the patient Obesity is associated with worsening asthma, shortness of breath, and potential for cardiac disease, diabetes, and other underlying medical conditions. \par \par  Problem 8: Health maintenance \par - Administered an influenza vaccine today 1/11/2019\par -recommended strep pneumonia vaccines: Prevnar-13 vaccine, followed by Pneumo vaccine 23 one year following\par -recommended early intervention for URIs\par -recommended regular osteoporosis evaluations\par -recommended early dermatological evaluations\par -recommended after the age of 50 to the age of 70, colonoscopy every 5 years \par \par f/u in 3-4 months \par pt is encouraged to call or fax the office with any questions or concerns. \par Explained to the pt in full detail with demonstrations how to use the inhalers and inhaler hygiene. \par -Education provided to the PT regarding their visit and conditions.

## 2019-05-13 NOTE — ADDENDUM
[FreeTextEntry1] : Documented by Kt Gu acting as a scribe for Dr. Pato Garcia on 5/13/2019\par \par All medical record entries made by the Scribe were at my, Dr. Pato Garcia's, direction and personally dictated by me on 5/13/2019. I have reviewed the chart and agree that the record accurately reflects my personal performance of the history, physical exam, assessment and plan. I have also personally directed, reviewed, and agree with the discharge instructions. \par \par \par \par \par

## 2019-05-23 ENCOUNTER — TRANSCRIPTION ENCOUNTER (OUTPATIENT)
Age: 64
End: 2019-05-23

## 2019-05-28 ENCOUNTER — APPOINTMENT (OUTPATIENT)
Dept: OTOLARYNGOLOGY | Facility: CLINIC | Age: 64
End: 2019-05-28
Payer: MEDICARE

## 2019-05-28 VITALS
SYSTOLIC BLOOD PRESSURE: 169 MMHG | WEIGHT: 153 LBS | HEART RATE: 61 BPM | BODY MASS INDEX: 25.49 KG/M2 | HEIGHT: 65 IN | DIASTOLIC BLOOD PRESSURE: 94 MMHG

## 2019-05-28 DIAGNOSIS — E04.1 NONTOXIC SINGLE THYROID NODULE: ICD-10-CM

## 2019-05-28 DIAGNOSIS — K13.0 DISEASES OF LIPS: ICD-10-CM

## 2019-05-28 DIAGNOSIS — C02.9 MALIGNANT NEOPLASM OF TONGUE, UNSPECIFIED: ICD-10-CM

## 2019-05-28 PROCEDURE — 99214 OFFICE O/P EST MOD 30 MIN: CPT

## 2019-05-28 NOTE — REASON FOR VISIT
[Subsequent Evaluation] : a subsequent evaluation for [FreeTextEntry2] : lip lesion, h/o tongue cancer

## 2019-05-28 NOTE — PHYSICAL EXAM
[de-identified] : L thyroid nodule unchanged [de-identified] : Mild erythema [de-identified] : Lingual scar unchanged.   [Midline] : trachea located in midline position [de-identified] : No lip lesion identified on either lip. [Normal] : no rashes

## 2019-05-28 NOTE — CONSULT LETTER
[Dear  ___] : Dear  [unfilled], [Courtesy Letter:] : I had the pleasure of seeing your patient, [unfilled], in my office today. [Please see my note below.] : Please see my note below. [Consult Closing:] : Thank you very much for allowing me to participate in the care of this patient.  If you have any questions, please do not hesitate to contact me. [Sincerely,] : Sincerely, [FreeTextEntry2] : Rajesh Denney MD [FreeTextEntry3] : Pablo Simon MD, FACS\par Clinical \par Dept. of Otolaryngology\par [facility]\par

## 2019-05-28 NOTE — HISTORY OF PRESENT ILLNESS
[de-identified] : Mr. 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  He presents today reporting a lesion of the lower lip at the beginning of May.  He notes the lesion is much smaller than when he first noted it.  Denies any pain or discomfort. [Neck Mass] : no neck mass [Painful Swallowing] : no painful swallowing [Difficulty Swallowing] : no difficulty swallowing

## 2019-06-04 ENCOUNTER — RESULT REVIEW (OUTPATIENT)
Age: 64
End: 2019-06-04

## 2019-09-16 ENCOUNTER — APPOINTMENT (OUTPATIENT)
Dept: PULMONOLOGY | Facility: CLINIC | Age: 64
End: 2019-09-16
Payer: MEDICARE

## 2019-09-16 ENCOUNTER — NON-APPOINTMENT (OUTPATIENT)
Age: 64
End: 2019-09-16

## 2019-09-16 VITALS
BODY MASS INDEX: 25.33 KG/M2 | OXYGEN SATURATION: 100 % | HEART RATE: 67 BPM | DIASTOLIC BLOOD PRESSURE: 65 MMHG | WEIGHT: 152 LBS | HEIGHT: 65 IN | SYSTOLIC BLOOD PRESSURE: 130 MMHG | RESPIRATION RATE: 17 BRPM

## 2019-09-16 PROCEDURE — 99214 OFFICE O/P EST MOD 30 MIN: CPT | Mod: 25

## 2019-09-16 PROCEDURE — 95012 NITRIC OXIDE EXP GAS DETER: CPT

## 2019-09-16 PROCEDURE — 94010 BREATHING CAPACITY TEST: CPT

## 2019-09-16 NOTE — ASSESSMENT
[FreeTextEntry1] : Mr. Platt is a 64 year old male with a prior history of tongue cancer, asthma, allergy, GERD, Casiano's esophagus, and ? OSAS, who now comes in for a pulmonary reevaluation. His number one issue is depression (due to his wife's death) and poor sleep, though he is overall improved with weight loss.\par \par The patient's SOB is felt to be multifactorial:\par - Overweight\par - Out-of Shape\par - Poor breathing mechanics\par - Asthma/ Allergy\par \par Problem 1: Asthma \par - Continue Advair Diskus 250 1 inhalation BID\par - Continue rescue inhaler: Ventolin 2 puffs up to QID\par - Continue treatment of albuterol 1 unit does via a nebulizer up to TID\par Asthma is believed to be caused by inherited (genetic) and environmental factor, but its exact cause is unknown. Asthma may be triggered by allergens, lung infections, or irritants in the air. Asthma triggers are different for each person \par -Inhaler technique reviewed as well as oral hygiene techniques reviewed with patient. Avoidance of cold air, extremes of temperature, rescue inhaler should be used before exercise. Order of medication reviewed with patient. Recommended use of a cool mist humidifier in the bedroom. \par \par Problem 2: Allergic Rhinitis \par - Continue olopatadine .6% 1 sniff/nostril BID\par - Continue Olopatadine 0.2% eye drops, one drop in each eye BID \par - Recommended Navage Nasal Saline \par - Blood work to include: asthma profile, food IgE panel, eosinophil level, IgE level, Vitamin D level (noncompliant)\par - Environmental measures for allergies were encouraged including mattress and pillow cover, air purifier, and environmental controls.\par \par Problem 3: Reflux/ GERD (Casiano's Esophagus)\par - Continue Omeprazole 40 mg before breakfast\par - Things to avoid including overeating, spicy foods, tight clothing, eating within three hours of bed, this list is not all inclusive. \par -For treatment of reflux, possible options discussed including diet control, H2 blockers, PPIs, as well as coating motility agents discussed as treatment options. Timing of meals and proximity of last meal to sleep were discussed. If symptoms persist, a formal gastrointestinal evaluation is needed.\par \par Problem 4: r/o NORMA\par - Based on his mallampatti class, EDS, and Casiano's esophagus, he is recommended to have a home sleep study to r/o for NORMA\par - Blood work to include thyroid function test and Free and Total Testosterone Test\par Sleep apnea is associated with adverse clinical consequences which an affect most organ systems. Cardiovascular disease risk includes arrhythmias, atrial fibrillation, hypertension, coronary artery disease, and stroke. Metabolic disorders include diabetes type 2, non-alcoholic fatty liver disease. Mood disorder especially depression; and cognitive decline especially in the elderly. Associations with chronic reflux/Casiano’s esophagus some but not all inclusive. \par -Reasons include arousal consistent with hypopnea; respiratory events most prominent in REM sleep or supine position; therefore sleep staging and body position are important for accurate diagnosis and estimation of AHI. \par \par Problem 5: Cardiac \par - follow up with cardiologist \par \par Problem 6: Poor Breathing Mechanics \par - Proper breathing techniques were reviewed with an emphasis of exhalation. Patient instructed to breath in for 1 second and out for four seconds. Patient was encouraged to not talk while walking. \par \par Problem 7: Overweight (improved)\par - Diet improved \par - Weight loss, exercise, and diet control were discussed and are highly encouraged. Treatment options were given such as, aqua therapy, and contacting a nutritionist. Recommended to use the elliptical, stationary bike, less use of treadmill. Mindful eating was explained to the patient Obesity is associated with worsening asthma, shortness of breath, and potential for cardiac disease, diabetes, and other underlying medical conditions. \par \par  Problem 8: Health maintenance \par - Administered an influenza vaccine 1/11/2019\par -recommended strep pneumonia vaccines: Prevnar-13 vaccine, followed by Pneumo vaccine 23 one year following\par -recommended early intervention for URIs\par -recommended regular osteoporosis evaluations\par -recommended early dermatological evaluations\par -recommended after the age of 50 to the age of 70, colonoscopy every 5 years \par \par f/u in 3-4 months \par pt is encouraged to call or fax the office with any questions or concerns. \par Explained to the pt in full detail with demonstrations how to use the inhalers and inhaler hygiene. \par -Education provided to the PT regarding their visit and conditions.

## 2019-09-16 NOTE — PROCEDURE
[FreeTextEntry1] : PFT revealed normal flows, with a FEV1 of 2.94 L, which is 104% of predicted, with a normal flow volume loop\par \par FENO was 16; a normal value being less than 25\par Fractional exhaled nitric oxide (FENO) is regarded as a simple, noninvasive method for assessing eosinophilic airway inflammation. Produced by a variety of cells within the lung, nitric oxide (NO) concentrations are generally low in healthy individuals. However, high concentrations of NO appear to be involved in nonspecific host defense mechanisms and chronic inflammatory diseases such as asthma. The American Thoracic Society (ATS) therefore has recommended using FENO to aid in the diagnosis and monitoring of eosinophilic airway inflammation and asthma, and for identifying steroid responsive individuals whose chronic respiratory symptoms may be caused by airway inflammation.

## 2019-09-16 NOTE — REVIEW OF SYSTEMS
[Negative] : Sleep Disorder [Fatigue] : fatigue [Recent Wt Loss (___ Lbs)] : recent [unfilled] ~Ulb weight loss [As Noted in HPI] : as noted in HPI [Heartburn] : heartburn [Reflux] : reflux [Wheezing] : no wheezing [Cough] : no cough [Chest Discomfort] : no chest discomfort [Dysphagia] : no dysphagia [Constipation] : no constipation [Diarrhea] : no diarrhea

## 2019-09-16 NOTE — HISTORY OF PRESENT ILLNESS
[FreeTextEntry1] : Mr. Platt is a 64 year old male coming into the office today for a follow up visit with a history of allergic rhinitis, cough, GERD, snoring, and SOB. His chief complaint is his parents' health / loneliness and depression.\par -he reports feeling okay\par -he reports feeling fatigued due to stress\par -he notes he has been taking care of his parents' health issues\par -he reports his sleep had been very good until his work became more busy and his parents health issues. His sleep quality is still good.\par -he notes he has lost 17-18 pounds this year, due to dieting and exercising\par -he reports taking Allegra D and Olopatadine eye drops and nose spray for allergy symptoms.\par -he reports getting reflux infrequently, and takes Prilosec regularly\par -he notes he takes Trintelex and xanax for anxiety and depression\par -he denies any coughing, wheezing, SOB, chest pain, chest pressure, diarrhea, constipation, dysphagia, dizziness, sour taste in the mouth

## 2019-09-16 NOTE — PHYSICAL EXAM
[General Appearance - Well Developed] : well developed [Well Groomed] : well groomed [No Deformities] : no deformities [General Appearance - Well Nourished] : well nourished [General Appearance - In No Acute Distress] : no acute distress [Normal Conjunctiva] : the conjunctiva exhibited no abnormalities [Eyelids - No Xanthelasma] : the eyelids demonstrated no xanthelasmas [Normal Oropharynx] : normal oropharynx [Neck Appearance] : the appearance of the neck was normal [Neck Cervical Mass (___cm)] : no neck mass was observed [Thyroid Diffuse Enlargement] : the thyroid was not enlarged [Jugular Venous Distention Increased] : there was no jugular-venous distention [Thyroid Nodule] : there were no palpable thyroid nodules [Heart Rate And Rhythm] : heart rate and rhythm were normal [Heart Sounds] : normal S1 and S2 [Respiration, Rhythm And Depth] : normal respiratory rhythm and effort [Exaggerated Use Of Accessory Muscles For Inspiration] : no accessory muscle use [Auscultation Breath Sounds / Voice Sounds] : lungs were clear to auscultation bilaterally [Abdomen Soft] : soft [Abdomen Tenderness] : non-tender [Abnormal Walk] : normal gait [Abdomen Mass (___ Cm)] : no abdominal mass palpated [Gait - Sufficient For Exercise Testing] : the gait was sufficient for exercise testing [Cyanosis, Localized] : no localized cyanosis [Nail Clubbing] : no clubbing of the fingernails [Petechial Hemorrhages (___cm)] : no petechial hemorrhages [] : no ischemic changes [Sensation] : the sensory exam was normal to light touch and pinprick [Deep Tendon Reflexes (DTR)] : deep tendon reflexes were 2+ and symmetric [No Focal Deficits] : no focal deficits [Impaired Insight] : insight and judgment were intact [Oriented To Time, Place, And Person] : oriented to person, place, and time [Affect] : the affect was normal [III] : III [FreeTextEntry1] : I:E 1:3, clear

## 2019-09-16 NOTE — ADDENDUM
[FreeTextEntry1] : Documented by Rene Maier acting as a scribe for Dr. Pato Garcia on 09/16/2019.\par \par All medical record entries made by the Scribe were at my, Dr. Pato Garcia's, direction and personally dictated by me on 09/16/2019. I have reviewed the chart and agree that the record accurately reflects my personal performance of the history, physical exam, assessment and plan. I have also personally directed, reviewed, and agree with the discharge instructions. \par \par

## 2019-10-15 ENCOUNTER — APPOINTMENT (OUTPATIENT)
Dept: SLEEP CENTER | Facility: CLINIC | Age: 64
End: 2019-10-15
Payer: MEDICARE

## 2019-10-15 ENCOUNTER — OUTPATIENT (OUTPATIENT)
Dept: OUTPATIENT SERVICES | Facility: HOSPITAL | Age: 64
LOS: 1 days | End: 2019-10-15
Payer: MEDICARE

## 2019-10-15 DIAGNOSIS — Z98.89 OTHER SPECIFIED POSTPROCEDURAL STATES: Chronic | ICD-10-CM

## 2019-10-15 PROCEDURE — 95806 SLEEP STUDY UNATT&RESP EFFT: CPT

## 2019-10-15 PROCEDURE — 95806 SLEEP STUDY UNATT&RESP EFFT: CPT | Mod: 26

## 2019-10-24 DIAGNOSIS — G47.33 OBSTRUCTIVE SLEEP APNEA (ADULT) (PEDIATRIC): ICD-10-CM

## 2019-12-06 ENCOUNTER — TRANSCRIPTION ENCOUNTER (OUTPATIENT)
Age: 64
End: 2019-12-06

## 2020-01-17 ENCOUNTER — APPOINTMENT (OUTPATIENT)
Dept: PULMONOLOGY | Facility: CLINIC | Age: 65
End: 2020-01-17

## 2020-03-18 ENCOUNTER — TRANSCRIPTION ENCOUNTER (OUTPATIENT)
Age: 65
End: 2020-03-18

## 2020-08-18 ENCOUNTER — TRANSCRIPTION ENCOUNTER (OUTPATIENT)
Age: 65
End: 2020-08-18

## 2021-06-13 ENCOUNTER — TRANSCRIPTION ENCOUNTER (OUTPATIENT)
Age: 66
End: 2021-06-13

## 2021-09-09 ENCOUNTER — TRANSCRIPTION ENCOUNTER (OUTPATIENT)
Age: 66
End: 2021-09-09

## 2021-11-16 ENCOUNTER — TRANSCRIPTION ENCOUNTER (OUTPATIENT)
Age: 66
End: 2021-11-16

## 2021-12-12 ENCOUNTER — TRANSCRIPTION ENCOUNTER (OUTPATIENT)
Age: 66
End: 2021-12-12

## 2022-03-02 ENCOUNTER — NON-APPOINTMENT (OUTPATIENT)
Age: 67
End: 2022-03-02

## 2022-03-02 ENCOUNTER — APPOINTMENT (OUTPATIENT)
Dept: PULMONOLOGY | Facility: CLINIC | Age: 67
End: 2022-03-02
Payer: MEDICARE

## 2022-03-02 VITALS
RESPIRATION RATE: 16 BRPM | BODY MASS INDEX: 25.49 KG/M2 | OXYGEN SATURATION: 98 % | DIASTOLIC BLOOD PRESSURE: 76 MMHG | WEIGHT: 153 LBS | HEART RATE: 63 BPM | SYSTOLIC BLOOD PRESSURE: 136 MMHG | TEMPERATURE: 97 F | HEIGHT: 65 IN

## 2022-03-02 DIAGNOSIS — J01.90 ACUTE SINUSITIS, UNSPECIFIED: ICD-10-CM

## 2022-03-02 PROCEDURE — 71046 X-RAY EXAM CHEST 2 VIEWS: CPT

## 2022-03-02 PROCEDURE — 94010 BREATHING CAPACITY TEST: CPT

## 2022-03-02 PROCEDURE — 95012 NITRIC OXIDE EXP GAS DETER: CPT

## 2022-03-02 PROCEDURE — 99214 OFFICE O/P EST MOD 30 MIN: CPT | Mod: 25

## 2022-03-02 RX ORDER — OLOPATADINE HYDROCHLORIDE 665 UG/1
0.6 SPRAY, METERED NASAL
Qty: 3 | Refills: 1 | Status: ACTIVE | COMMUNITY
Start: 2022-03-02 | End: 1900-01-01

## 2022-03-02 RX ORDER — FLUTICASONE PROPIONATE AND SALMETEROL 50; 250 UG/1; UG/1
250-50 POWDER RESPIRATORY (INHALATION)
Qty: 1 | Refills: 5 | Status: DISCONTINUED | COMMUNITY
Start: 2018-07-24 | End: 2022-03-02

## 2022-03-02 RX ORDER — OLOPATADINE HYDROCHLORIDE 665 UG/1
0.6 SPRAY, METERED NASAL
Qty: 3 | Refills: 1 | Status: DISCONTINUED | COMMUNITY
Start: 2018-07-24 | End: 2022-03-02

## 2022-03-02 NOTE — PROCEDURE
[FreeTextEntry1] : PFT revealed normal flows, with a FEV1 of 2.23L, which is 82% of predicted, with a normal flow volume loop\par  \par Feno was 38; a normal value being less than 25. Fractional exhaled nitric oxide (FENO) is regarded as a simple, noninvasive method for assessing eosinophilic airway inflammation. Produced by a variety of cells within the lung, nitric oxide (NO) concentrations are generally low in healthy individuals. However, high concentrations of NO appear to be involved in nonspecific host defense mechanisms and chronic inflammatory  diseases such as asthma. The American Thoracic Society (ATS) therefore recommended using FENO to aid in the diagnosis and monitoring of eosinophilic airway inflammation and asthma, and for identifying steroid responsive individuals whose chronic respiratory symptoms may be caused by airway inflammation \par \par CXR revealed a normal sized heart; there was no evidence of infiltrate or effusion -- A normal appearing chest radiograph multiple old rib fractres on the left

## 2022-03-02 NOTE — REVIEW OF SYSTEMS
[Fatigue] : fatigue [Recent Wt Loss (___ Lbs)] : recent [unfilled] ~Ulb weight loss [As Noted in HPI] : as noted in HPI [Heartburn] : heartburn [Reflux] : reflux [Negative] : Sleep Disorder [Cough] : no cough [Wheezing] : no wheezing [Chest Discomfort] : no chest discomfort [Dysphagia] : no dysphagia [Constipation] : no constipation [Diarrhea] : no diarrhea

## 2022-03-02 NOTE — ASSESSMENT
[FreeTextEntry1] : Mr. Platt is a 67 year old male with a prior history of tongue cancer, asthma, allergy, GERD, Casiano's esophagus, and ? OSAS, who now comes in for a pulmonary reevaluation. His number one issue is acute sinusitis / asthmatic bronchitis \par \par The patient's SOB is felt to be multifactorial:\par - Overweight\par - Out-of Shape\par - Poor breathing mechanics\par - Asthma/ Allergy\par \par Problem 1: Asthma (Active)\par - Continue Advair Diskus 250 1 inhalation BID\par - Continue rescue inhaler: Ventolin 2 puffs up to QID\par - Continue treatment of albuterol 1 unit does via a nebulizer up to TID\par -Add Prednisone 20mg for 7 days then 10mg for 7 days \par -Information sheet given to the patient to be reviewed, this medication is never to be used without consulting the prescribing physician. Proper dietary restraint is necessary specifically salt containing foods, if any reaction may occur should be reported. \par Asthma is believed to be caused by inherited (genetic) and environmental factor, but its exact cause is unknown. Asthma may be triggered by allergens, lung infections, or irritants in the air. Asthma triggers are different for each person \par -Inhaler technique reviewed as well as oral hygiene techniques reviewed with patient. Avoidance of cold air, extremes of temperature, rescue inhaler should be used before exercise. Order of medication reviewed with patient. Recommended use of a cool mist humidifier in the bedroom. \par \par Problem 2: Allergic Rhinitis \par - Continue olopatadine .6% 1 sniff/nostril BID\par - Continue Olopatadine 0.2% eye drops, one drop in each eye BID \par - Recommended Navage Nasal Saline \par - Blood work to include: asthma profile, food IgE panel, eosinophil level, IgE level, Vitamin D level (noncompliant)\par - Environmental measures for allergies were encouraged including mattress and pillow cover, air purifier, and environmental controls.\par \par Problem 2A: Acute sinusitis \par -add Augmentin 875 mg BID (28)\par You have a clinical scenario most c/w acute sinusitis the etiology of which is unknown (bacterial/viral/fungal). Hydration, steaming, intranasal saline is needed. \par Problem 3: Reflux/ GERD (Casiano's Esophagus)\par - Continue Omeprazole 40 mg before breakfast\par - Things to avoid including overeating, spicy foods, tight clothing, eating within three hours of bed, this list is not all inclusive. \par -For treatment of reflux, possible options discussed including diet control, H2 blockers, PPIs, as well as coating motility agents discussed as treatment options. Timing of meals and proximity of last meal to sleep were discussed. If symptoms persist, a formal gastrointestinal evaluation is needed.\par \par Problem 4: (+)NORMA- mild NC \par - Based on his mallampatti class, EDS, and Casiano's esophagus, he is recommended to have a home sleep study to r/o for NORMA\par - Blood work to include thyroid function test and Free and Total Testosterone Test\par Sleep apnea is associated with adverse clinical consequences which an affect most organ systems. Cardiovascular disease risk includes arrhythmias, atrial fibrillation, hypertension, coronary artery disease, and stroke. Metabolic disorders include diabetes type 2, non-alcoholic fatty liver disease. Mood disorder especially depression; and cognitive decline especially in the elderly. Associations with chronic reflux/Casiano’s esophagus some but not all inclusive. \par -Reasons include arousal consistent with hypopnea; respiratory events most prominent in REM sleep or supine position; therefore sleep staging and body position are important for accurate diagnosis and estimation of AHI. \par \par Problem 5: Cardiac \par - follow up with cardiologist \par \par Problem 6: Poor Breathing Mechanics \par -Recommended Wim Hof and Buteyko breathing techniques \par - Proper breathing techniques were reviewed with an emphasis of exhalation. Patient instructed to breath in for 1 second and out for four seconds. Patient was encouraged to not talk while walking. \par \par Problem 7: Overweight (improved)\par - Diet improved \par - Weight loss, exercise, and diet control were discussed and are highly encouraged. Treatment options were given such as, aqua therapy, and contacting a nutritionist. Recommended to use the elliptical, stationary bike, less use of treadmill. Mindful eating was explained to the patient Obesity is associated with worsening asthma, shortness of breath, and potential for cardiac disease, diabetes, and other underlying medical conditions. \par \par  Problem 8: Health maintenance \par - Administered an influenza vaccine 1/11/2019\par -recommended strep pneumonia vaccines: Prevnar-13 vaccine, followed by Pneumo vaccine 23 one year following\par -recommended early intervention for URIs\par -recommended regular osteoporosis evaluations\par -recommended early dermatological evaluations\par -recommended after the age of 50 to the age of 70, colonoscopy every 5 years \par \par f/u in 3-4 months \par pt is encouraged to call or fax the office with any questions or concerns. \par Explained to the pt in full detail with demonstrations how to use the inhalers and inhaler hygiene. \par -Education provided to the PT regarding their visit and conditions.

## 2022-03-02 NOTE — PHYSICAL EXAM
[General Appearance - Well Developed] : well developed [Well Groomed] : well groomed [General Appearance - Well Nourished] : well nourished [No Deformities] : no deformities [General Appearance - In No Acute Distress] : no acute distress [Normal Conjunctiva] : the conjunctiva exhibited no abnormalities [Eyelids - No Xanthelasma] : the eyelids demonstrated no xanthelasmas [Normal Oropharynx] : normal oropharynx [III] : III [Neck Appearance] : the appearance of the neck was normal [Neck Cervical Mass (___cm)] : no neck mass was observed [Jugular Venous Distention Increased] : there was no jugular-venous distention [Thyroid Diffuse Enlargement] : the thyroid was not enlarged [Thyroid Nodule] : there were no palpable thyroid nodules [Heart Rate And Rhythm] : heart rate and rhythm were normal [Heart Sounds] : normal S1 and S2 [Respiration, Rhythm And Depth] : normal respiratory rhythm and effort [Exaggerated Use Of Accessory Muscles For Inspiration] : no accessory muscle use [Auscultation Breath Sounds / Voice Sounds] : lungs were clear to auscultation bilaterally [Abdomen Soft] : soft [Abdomen Tenderness] : non-tender [Abdomen Mass (___ Cm)] : no abdominal mass palpated [Abnormal Walk] : normal gait [Gait - Sufficient For Exercise Testing] : the gait was sufficient for exercise testing [Nail Clubbing] : no clubbing of the fingernails [Cyanosis, Localized] : no localized cyanosis [Petechial Hemorrhages (___cm)] : no petechial hemorrhages [] : no ischemic changes [Sensation] : the sensory exam was normal to light touch and pinprick [Deep Tendon Reflexes (DTR)] : deep tendon reflexes were 2+ and symmetric [No Focal Deficits] : no focal deficits [Oriented To Time, Place, And Person] : oriented to person, place, and time [Impaired Insight] : insight and judgment were intact [Affect] : the affect was normal [FreeTextEntry1] : I:E 1:3, mild expiratory wheeze

## 2022-03-02 NOTE — HISTORY OF PRESENT ILLNESS
[FreeTextEntry1] : Mr. Platt is a 67 year old male coming into the office today for a follow up visit with a history of allergic rhinitis, cough, GERD, snoring, and SOB. His chief complaint is \par - he has been fine until last week, he started to get a head cold and then he started coughing. Took a COVID test and it was negative. \par - he had went to [place] and went skiing. \par - has been having chest pressure and congestion\par - he notes his legs feel weak but that’s from skiing. \par - he notes he has been sleeping well \par - no heart burn \par - he's not sure if he's snoring \par - he has been dealing with sciatica\par - he has been bringing up green mucus \par - he has been using Mucinex \par patient denies any headaches, nausea, vomiting, fever, chills, sweats, chest pain, chest pressure, palpitations, coughing, wheezing, fatigue, diarrhea, constipation, dysphagia, myalgias, dizziness, leg swelling, leg pain, itchy eyes, itchy ears, heartburn, reflux or sour taste in the mouth

## 2022-03-02 NOTE — ADDENDUM
[FreeTextEntry1] : Documented by Earlene Craig acting as a scribe for Dr. Pato Garcia on (03/02/2022).\par \par All medical record entries made by the Scribe were at my, Dr. Pato Garcia's, direction and personally dictated by me on (03/02/2022). I have reviewed the chart and agree that the record accurately reflects my personal performance of the history, physical exam, assessment and plan. I have also personally directed, reviewed, and agree with the discharge instructions.\par

## 2022-03-10 ENCOUNTER — APPOINTMENT (OUTPATIENT)
Dept: ORTHOPEDIC SURGERY | Facility: CLINIC | Age: 67
End: 2022-03-10
Payer: MEDICARE

## 2022-03-10 VITALS
BODY MASS INDEX: 25.49 KG/M2 | DIASTOLIC BLOOD PRESSURE: 82 MMHG | WEIGHT: 153 LBS | SYSTOLIC BLOOD PRESSURE: 135 MMHG | HEART RATE: 65 BPM | HEIGHT: 65 IN

## 2022-03-10 DIAGNOSIS — M25.562 PAIN IN LEFT KNEE: ICD-10-CM

## 2022-03-10 PROCEDURE — 73564 X-RAY EXAM KNEE 4 OR MORE: CPT | Mod: LT

## 2022-03-10 PROCEDURE — 99203 OFFICE O/P NEW LOW 30 MIN: CPT

## 2022-03-10 NOTE — PHYSICAL EXAM
[LE] : Sensory: Intact in bilateral lower extremities [DP] : dorsalis pedis 2+ and symmetric bilaterally [PT] : posterior tibial 2+ and symmetric bilaterally [Normal] : Alert and in no acute distress [Poor Appearance] : well-appearing [Acute Distress] : not in acute distress [Obese] : not obese [de-identified] : The patient has no respiratory distress. Mood and affect are normal. The patient is alert and oriented to person, place and time.\par There is no pain with active or passive motion of the hips.  There is no tenderness of either hip.  Examination of the left knee demonstrates no swelling or deformity.  Quadriceps and hamstring function are intact.  The collateral and cruciate ligaments are stable.  There is no pain with varus or valgus stress.  There is slight tenderness at the proximal left fibula.  There is no joint line tenderness.  Nicola test is negative.  Range of motion 0 to 120 degrees bilaterally.  The calves are soft and nontender.  The skin is intact.  There is no lymphedema. [de-identified] : AP, lateral, tunnel and sunrise x-rays of the left knee demonstrate no fracture or dislocation.  There are degenerative changes at the patellofemoral and lateral compartments

## 2022-03-10 NOTE — DISCUSSION/SUMMARY
[de-identified] : The patient has sustained a sprain to his left knee.  There is no clinical evidence of meniscal or ligamentous damage.  I recommend he take Aleve once or twice a day for his pain.  He will gradually resume his cycling exercises.  If symptomatic in 1 month he should return.

## 2022-03-10 NOTE — HISTORY OF PRESENT ILLNESS
[de-identified] : Patient is a 67 year-old male who presents to the office today for initial evaluation of left knee pain. He was skiing last week and after 2-3 days of skiing, he wound up with profound weakness of the knee to allow for moving the skis in the proper direction. He has been having pain going up and down stairs with some slight limping as well as brisk walking. The pain is not present when he is walking or laying down. Pain is located on the outside of the knee. He has been taking Aleve which gave mild relief.

## 2022-05-16 ENCOUNTER — APPOINTMENT (OUTPATIENT)
Dept: PULMONOLOGY | Facility: CLINIC | Age: 67
End: 2022-05-16
Payer: MEDICARE

## 2022-05-16 VITALS
DIASTOLIC BLOOD PRESSURE: 60 MMHG | WEIGHT: 150 LBS | RESPIRATION RATE: 16 BRPM | TEMPERATURE: 97.7 F | HEIGHT: 65 IN | BODY MASS INDEX: 24.99 KG/M2 | OXYGEN SATURATION: 96 % | SYSTOLIC BLOOD PRESSURE: 118 MMHG | HEART RATE: 74 BPM

## 2022-05-16 DIAGNOSIS — J45.909 UNSPECIFIED ASTHMA, UNCOMPLICATED: ICD-10-CM

## 2022-05-16 PROCEDURE — 99214 OFFICE O/P EST MOD 30 MIN: CPT

## 2022-05-16 RX ORDER — NIRMATRELVIR AND RITONAVIR 300-100 MG
20 X 150 MG & KIT ORAL
Qty: 30 | Refills: 0 | Status: DISCONTINUED | COMMUNITY
Start: 2022-04-28

## 2022-05-16 RX ORDER — PREDNISONE 10 MG/1
10 TABLET ORAL
Qty: 50 | Refills: 0 | Status: DISCONTINUED | COMMUNITY
Start: 2022-03-02 | End: 2022-05-16

## 2022-05-16 RX ORDER — VALSARTAN 160 MG/1
160 TABLET, COATED ORAL
Qty: 90 | Refills: 0 | Status: ACTIVE | COMMUNITY
Start: 2022-04-19

## 2022-05-16 RX ORDER — AMOXICILLIN AND CLAVULANATE POTASSIUM 875; 125 MG/1; MG/1
875-125 TABLET, COATED ORAL
Qty: 28 | Refills: 0 | Status: DISCONTINUED | COMMUNITY
Start: 2022-03-02 | End: 2022-05-16

## 2022-05-16 RX ORDER — ALPRAZOLAM 0.5 MG/1
0.5 TABLET ORAL
Qty: 120 | Refills: 0 | Status: ACTIVE | COMMUNITY
Start: 2022-04-27

## 2022-05-16 NOTE — ASSESSMENT
[FreeTextEntry1] : Mr. Platt is a 67 year old male with a prior history of tongue cancer, asthma, allergy, GERD, Casiano's esophagus, and ? OSAS, who now comes in for a pulmonary reevaluation. His number one issue is post covid-19 4/2022\par \par The patient's SOB is felt to be multifactorial:\par - Overweight\par - Out-of Shape\par - Poor breathing mechanics\par - Asthma/ Allergy\par \par Problem 1: Asthma (Active)\par - Continue Advair Diskus 250 1 inhalation BID\par - Continue rescue inhaler: Ventolin 2 puffs up to QID\par - Continue treatment of albuterol 1 unit does via a nebulizer up to TID\par -Add Prednisone 20mg for 7 days then 10mg for 7 days \par -Information sheet given to the patient to be reviewed, this medication is never to be used without consulting the prescribing physician. Proper dietary restraint is necessary specifically salt containing foods, if any reaction may occur should be reported. \par Asthma is believed to be caused by inherited (genetic) and environmental factor, but its exact cause is unknown. Asthma may be triggered by allergens, lung infections, or irritants in the air. Asthma triggers are different for each person \par \par Prolem 1A: covid-19 infection 4/2022\par Problem: Health Maintenance/COVID19 Precautions \par -Recommend quarantine for 10 days \par - Clean your hands often. Wash your hands often with soap and water for at least 20 seconds, especially after blowing your nose, coughing, or sneezing, or having been in a public place.\par - If soap and water are not available, use a hand  that contains at least 60% alcohol.\par - To the extent possible, avoid touching high-touch surfaces in public places - elevator buttons, door handles, handrails, handshaking with people, etc. Use a tissue or your sleeve to cover your hand or finger if you must touch something.\par - Wash your hands after touching surfaces in public places.\par - Avoid touching your face, nose, eyes, etc.\par - Clean and disinfect your home to remove germs: practice routine cleaning of frequently touched surfaces (for example: tables, doorknobs, light switches, handles, desks, toilets, faucets, sinks & cell phones)\par - Avoid crowds, especially in poorly ventilated spaces. Your risk of exposure to respiratory viruses like COVID-19 may increase in crowded, closed-in settings with little air circulation if there are people in the crowd who are sick. All patients are recommended to practice social distancing and stay at least 6 feet away from others. \par - Avoid all non-essential travel including plane trips, and especially avoid embarking on cruise ships.\par -If COVID-19 is spreading in your community, take extra measures to put distance between yourself and other people to further reduce your risk of being exposed to this new virus.\par -Stay home as much as possible.\par - Consider ways of getting food brought to your house through family, social, or commercial networks\par -Be aware that the virus has been known to live in the air up to 3 hours post exposure, cardboard up to 24 hours post exposure, copper up to 4 hours post exposure, steel and plastic up to 2-3 days post exposure. Risk of transmission from these surfaces are affected by many variables.\par COVID-19 precautionary Immune Support Recommendations:\par -OTC Vitamin C 500mg BID \par -OTC Quercetin 250-500mg BID \par -OTC Zinc 75-100mg per day \par -OTC Melatonin 1 or 2mg a night \par -OTC Vitamin D 1-4000mg per day \par -OTC Tonic Water 8oz per day\par -Water 0.5-1 gallon per day\par Asthma and COVID19:\par You need to make sure your asthma is under control. This often requires the use of inhaled corticosteroids (and sometimes oral corticosteroids). Inhaled corticosteroids do not likely reduce your immune system’s ability to fight infections, but oral corticosteroids may. It is important to use the steps above to protect yourself to limit your exposure to any respiratory virus. \par -Inhaler technique reviewed as well as oral hygiene techniques reviewed with patient. Avoidance of cold air, extremes of temperature, rescue inhaler should be used before exercise. Order of medication reviewed with patient. Recommended use of a cool mist humidifier in the bedroom. \par \par Problem 2: Allergic Rhinitis \par - Continue olopatadine .6% 1 sniff/nostril BID\par - Continue Olopatadine 0.2% eye drops, one drop in each eye BID \par - Recommended Navage Nasal Saline \par - Blood work to include: asthma profile, food IgE panel, eosinophil level, IgE level, Vitamin D level (noncompliant)\par - Environmental measures for allergies were encouraged including mattress and pillow cover, air purifier, and environmental controls.\par \par Problem 3: Reflux/ GERD (Casiano's Esophagus)\par - Continue Omeprazole 40 mg before breakfast\par - Things to avoid including overeating, spicy foods, tight clothing, eating within three hours of bed, this list is not all inclusive. \par -For treatment of reflux, possible options discussed including diet control, H2 blockers, PPIs, as well as coating motility agents discussed as treatment options. Timing of meals and proximity of last meal to sleep were discussed. If symptoms persist, a formal gastrointestinal evaluation is needed.\par \par Problem 4: (+)NORMA- mild NC \par - Based on his mallampatti class, EDS, and Casiano's esophagus, he is recommended to have a home sleep study to r/o for NORMA\par - Blood work to include thyroid function test and Free and Total Testosterone Test\par Sleep apnea is associated with adverse clinical consequences which an affect most organ systems. Cardiovascular disease risk includes arrhythmias, atrial fibrillation, hypertension, coronary artery disease, and stroke. Metabolic disorders include diabetes type 2, non-alcoholic fatty liver disease. Mood disorder especially depression; and cognitive decline especially in the elderly. Associations with chronic reflux/Casiano’s esophagus some but not all inclusive. \par -Reasons include arousal consistent with hypopnea; respiratory events most prominent in REM sleep or supine position; therefore sleep staging and body position are important for accurate diagnosis and estimation of AHI. \par \par Problem 5: Cardiac \par - follow up with cardiologist if needed ([facility]) \par \par Problem 6: Poor Breathing Mechanics \par -Recommended Wim Hof and Buteyko breathing techniques \par - Proper breathing techniques were reviewed with an emphasis of exhalation. Patient instructed to breath in for 1 second and out for four seconds. Patient was encouraged to not talk while walking. \par \par Problem 7: Overweight (improved)\par - Diet improved \par - Weight loss, exercise, and diet control were discussed and are highly encouraged. Treatment options were given such as, aqua therapy, and contacting a nutritionist. Recommended to use the elliptical, stationary bike, less use of treadmill. Mindful eating was explained to the patient Obesity is associated with worsening asthma, shortness of breath, and potential for cardiac disease, diabetes, and other underlying medical conditions. \par \par  Problem 8: Health maintenance \par - Administered an influenza vaccine 2021\par -recommended strep pneumonia vaccines: Prevnar-13 vaccine, followed by Pneumo vaccine 23 one year following\par -recommended early intervention for URIs\par -recommended regular osteoporosis evaluations\par -recommended early dermatological evaluations\par -recommended after the age of 50 to the age of 70, colonoscopy every 5 years \par \par f/u in 3-4 months \par pt is encouraged to call or fax the office with any questions or concerns. \par Explained to the pt in full detail with demonstrations how to use the inhalers and inhaler hygiene. \par -Education provided to the PT regarding their visit and conditions.

## 2022-05-16 NOTE — HISTORY OF PRESENT ILLNESS
[FreeTextEntry1] : Mr. Platt is a 67 year old male coming into the office today for a follow up visit with a history of allergic rhinitis, cough, GERD, snoring, and SOB. His chief complaint is \par -he notes having gotten Paxlovid \par -he notes getting covid \par -he notes having a cough for 2-3 weeks \par -he notes using nebulizer and inhalers \par -he notes his mucous was thick\par -he notes tea helped with the mucous \par -he denies sore throat while infected \par -he notes losing some weight \par -he denies wheezing now \par -he notes planning on going to [place] [facility] \par patient denies any headaches, nausea, vomiting, fever, chills, sweats, chest pain, chest pressure, palpitations, coughing, wheezing, fatigue, diarrhea, constipation, dysphagia, myalgias, dizziness, leg swelling, leg pain, itchy eyes, itchy ears, heartburn, reflux or sour taste in the mouth

## 2022-05-16 NOTE — ADDENDUM
[FreeTextEntry1] : Documented by Shaun Espinosa acting as a scribe for Dr. Pato Garcia on 05/16/2022 .\par \par All medical record entries made by the Scribe were at my, Dr. Pato Garcia's, direction and personally dictated by me on. I have reviewed the chart and agree that the record accurately reflects my personal performance of the history, physical exam, assessment and plan. I have also personally directed, reviewed, and agree with the discharge instructions.

## 2022-07-13 ENCOUNTER — APPOINTMENT (OUTPATIENT)
Dept: PULMONOLOGY | Facility: CLINIC | Age: 67
End: 2022-07-13

## 2022-07-25 ENCOUNTER — NON-APPOINTMENT (OUTPATIENT)
Age: 67
End: 2022-07-25

## 2022-09-19 ENCOUNTER — NON-APPOINTMENT (OUTPATIENT)
Age: 67
End: 2022-09-19

## 2022-09-19 ENCOUNTER — APPOINTMENT (OUTPATIENT)
Dept: PULMONOLOGY | Facility: CLINIC | Age: 67
End: 2022-09-19

## 2022-09-19 VITALS
OXYGEN SATURATION: 98 % | WEIGHT: 149 LBS | TEMPERATURE: 97.3 F | BODY MASS INDEX: 23.95 KG/M2 | SYSTOLIC BLOOD PRESSURE: 120 MMHG | HEIGHT: 66 IN | DIASTOLIC BLOOD PRESSURE: 62 MMHG | RESPIRATION RATE: 16 BRPM | HEART RATE: 60 BPM

## 2022-09-19 PROCEDURE — 95012 NITRIC OXIDE EXP GAS DETER: CPT

## 2022-09-19 PROCEDURE — 99214 OFFICE O/P EST MOD 30 MIN: CPT | Mod: 25

## 2022-09-19 PROCEDURE — 94010 BREATHING CAPACITY TEST: CPT

## 2022-09-19 NOTE — HISTORY OF PRESENT ILLNESS
[FreeTextEntry1] : Mr. Platt is a 67 year old male coming into the office today for a follow up visit with a history of allergic rhinitis, cough, GERD, snoring, and SOB. His chief complaint is \par -he notes coming from a wedding from [facility]? \par -he notes tired from being out so much last few days \par -he notes sleeping well generally \par -he notes taking antidepressive meds \par -he notes feeling fit and exercising well \par -he notes having 5 buldging disks \par -pt states normal bowel movements but sometimes constipated \par -he notes stable weight but losing a few pounds\par -he denies swelling \par -he notes getting vaccine \par patient denies any headaches, nausea, vomiting, fever, chills, sweats, chest pain, chest pressure, palpitations, coughing, wheezing, fatigue, diarrhea, constipation, dysphagia, myalgias, dizziness, leg swelling, leg pain, itchy eyes, itchy ears, heartburn, reflux or sour taste in the mouth

## 2022-09-19 NOTE — ADDENDUM
[FreeTextEntry1] : Documented by Shaun Espinosa acting as a scribe for Dr. Pato Garcia on 09/19/2022 .\par \par All medical record entries made by the Scribe were at my, Dr. Pato Garcia's, direction and personally dictated by me on. I have reviewed the chart and agree that the record accurately reflects my personal performance of the history, physical exam, assessment and plan. I have also personally directed, reviewed, and agree with the discharge instructions.

## 2022-09-19 NOTE — PROCEDURE
[FreeTextEntry1] : PFT - spi reveals normal flows; FEV1 is 2.86 which is 101% of predicted, normal flow volume loop \par \par FENO was   17    ; a normal value being less than 25\par Fractional exhaled nitric oxide (FENO) is regarded as a simple, noninvasive method for assessing eosinophilic airway inflammation. Produced by a variety of cells within the lung, nitric oxide (NO) concentrations are generally low in healthy individuals. However, high concentrations of NO appear to be involved in nonspecific host defense mechanisms and chronic inflammatory diseases such as asthma. The American Thor[employer] Society (ATS) therefore has recommended using FENO to aid in the diagnosis and monitoring of eosinophilic airway inflammation and asthma, and for identifying steroid responsive individuals whose chronic respiratory symptoms may be caused by airway inflammation.

## 2022-09-19 NOTE — PHYSICAL EXAM

## 2022-09-19 NOTE — ASSESSMENT
[FreeTextEntry1] : Mr. Platt is a 67 year old male with a prior history of tongue cancer, asthma, allergy, GERD, Casiano's esophagus, and ? OSAS, His number one issue is post covid-19 4/2022- resolved \par \par The patient's SOB is felt to be multifactorial:\par - Overweight\par - Out-of Shape\par - Poor breathing mechanics\par - Asthma/ Allergy\par \par Problem 1: Asthma (stable) \par - Continue Advair Diskus 250 1 inhalation BID\par - Continue rescue inhaler: Ventolin 2 puffs up to QID\par - Continue treatment of albuterol 1 unit does via a nebulizer up to TID\par -Add Prednisone 20mg for 7 days then 10mg for 7 days \par -Information sheet given to the patient to be reviewed, this medication is never to be used without consulting the prescribing physician. Proper dietary restraint is necessary specifically salt containing foods, if any reaction may occur should be reported. \par Asthma is believed to be caused by inherited (genetic) and environmental factor, but its exact cause is unknown. Asthma may be triggered by allergens, lung infections, or irritants in the air. Asthma triggers are different for each person \par \par Prolem 1A: covid-19 infection 4/2022 -resolved \par Problem: Health Maintenance/COVID19 Precautions \par -Recommend quarantine for 10 days \par - Clean your hands often. Wash your hands often with soap and water for at least 20 seconds, especially after blowing your nose, coughing, or sneezing, or having been in a public place.\par - If soap and water are not available, use a hand  that contains at least 60% alcohol.\par - To the extent possible, avoid touching high-touch surfaces in public places - elevator buttons, door handles, handrails, handshaking with people, etc. Use a tissue or your sleeve to cover your hand or finger if you must touch something.\par - Wash your hands after touching surfaces in public places.\par - Avoid touching your face, nose, eyes, etc.\par - Clean and disinfect your home to remove germs: practice routine cleaning of frequently touched surfaces (for example: tables, doorknobs, light switches, handles, desks, toilets, faucets, sinks & cell phones)\par - Avoid crowds, especially in poorly ventilated spaces. Your risk of exposure to respiratory viruses like COVID-19 may increase in crowded, closed-in settings with little air circulation if there are people in the crowd who are sick. All patients are recommended to practice social distancing and stay at least 6 feet away from others. \par - Avoid all non-essential travel including plane trips, and especially avoid embarking on cruise ships.\par -If COVID-19 is spreading in your community, take extra measures to put distance between yourself and other people to further reduce your risk of being exposed to this new virus.\par -Stay home as much as possible.\par - Consider ways of getting food brought to your house through family, social, or commercial networks\par -Be aware that the virus has been known to live in the air up to 3 hours post exposure, cardboard up to 24 hours post exposure, copper up to 4 hours post exposure, steel and plastic up to 2-3 days post exposure. Risk of transmission from these surfaces are affected by many variables.\par COVID-19 precautionary Immune Support Recommendations:\par -OTC Vitamin C 500mg BID \par -OTC Quercetin 250-500mg BID \par -OTC Zinc 75-100mg per day \par -OTC Melatonin 1 or 2mg a night \par -OTC Vitamin D 1-4000mg per day \par -OTC Tonic Water 8oz per day\par -Water 0.5-1 gallon per day\par Asthma and COVID19:\par You need to make sure your asthma is under control. This often requires the use of inhaled corticosteroids (and sometimes oral corticosteroids). Inhaled corticosteroids do not likely reduce your immune system’s ability to fight infections, but oral corticosteroids may. It is important to use the steps above to protect yourself to limit your exposure to any respiratory virus. \par -Inhaler technique reviewed as well as oral hygiene techniques reviewed with patient. Avoidance of cold air, extremes of temperature, rescue inhaler should be used before exercise. Order of medication reviewed with patient. Recommended use of a cool mist humidifier in the bedroom. \par \par Problem 2: Allergic Rhinitis \par - Continue olopatadine .6% 1 sniff/nostril BID\par - Continue Olopatadine 0.2% eye drops, one drop in each eye BID \par - Recommended Navage Nasal Saline \par - Blood work to include: asthma profile, food IgE panel, eosinophil level, IgE level, Vitamin D level (noncompliant)\par - Environmental measures for allergies were encouraged including mattress and pillow cover, air purifier, and environmental controls.\par \par Problem 3: Reflux/ GERD (Casiano's Esophagus)\par - Continue Omeprazole 40 mg before breakfast\par - Things to avoid including overeating, spicy foods, tight clothing, eating within three hours of bed, this list is not all inclusive. \par -For treatment of reflux, possible options discussed including diet control, H2 blockers, PPIs, as well as coating motility agents discussed as treatment options. Timing of meals and proximity of last meal to sleep were discussed. If symptoms persist, a formal gastrointestinal evaluation is needed.\par \par Problem 4: (+)NORMA- mild NC \par - Based on his mallampatti class, EDS, and Casiano's esophagus, he is recommended to have a home sleep study to r/o for NORMA\par - Blood work to include thyroid function test and Free and Total Testosterone Test\par Sleep apnea is associated with adverse clinical consequences which an affect most organ systems. Cardiovascular disease risk includes arrhythmias, atrial fibrillation, hypertension, coronary artery disease, and stroke. Metabolic disorders include diabetes type 2, non-alcoholic fatty liver disease. Mood disorder especially depression; and cognitive decline especially in the elderly. Associations with chronic reflux/Casiano’s esophagus some but not all inclusive. \par -Reasons include arousal consistent with hypopnea; respiratory events most prominent in REM sleep or supine position; therefore sleep staging and body position are important for accurate diagnosis and estimation of AHI. \par \par Problem 5: Cardiac \par - follow up with cardiologist if needed (Decter) \par \par Problem 6: Poor Breathing Mechanics \par -Recommended Wim Hof and Buteyko breathing techniques \par - Proper breathing techniques were reviewed with an emphasis of exhalation. Patient instructed to breath in for 1 second and out for four seconds. Patient was encouraged to not talk while walking. \par \par Problem 7: Overweight (improved)\par - Diet improved \par - Weight loss, exercise, and diet control were discussed and are highly encouraged. Treatment options were given such as, aqua therapy, and contacting a nutritionist. Recommended to use the elliptical, stationary bike, less use of treadmill. Mindful eating was explained to the patient Obesity is associated with worsening asthma, shortness of breath, and potential for cardiac disease, diabetes, and other underlying medical conditions. \par \par  Problem 8: Health maintenance \par -Recommend "Sanotize" nasal spray \par - Administered an influenza vaccine 2021\par -recommended strep pneumonia vaccines: Prevnar-13 vaccine, followed by Pneumo vaccine 23 one year following\par -recommended early intervention for URIs\par -recommended regular osteoporosis evaluations\par -recommended early dermatological evaluations\par -recommended after the age of 50 to the age of 70, colonoscopy every 5 years \par \par f/u in 3-4 months \par pt is encouraged to call or fax the office with any questions or concerns. \par Explained to the pt in full detail with demonstrations how to use the inhalers and inhaler hygiene. \par -Education provided to the PT regarding their visit and conditions.

## 2022-10-20 ENCOUNTER — APPOINTMENT (OUTPATIENT)
Dept: PULMONOLOGY | Facility: CLINIC | Age: 67
End: 2022-10-20

## 2022-10-20 VITALS
TEMPERATURE: 97.6 F | HEART RATE: 57 BPM | DIASTOLIC BLOOD PRESSURE: 68 MMHG | BODY MASS INDEX: 23.78 KG/M2 | RESPIRATION RATE: 16 BRPM | HEIGHT: 66 IN | OXYGEN SATURATION: 97 % | WEIGHT: 148 LBS | SYSTOLIC BLOOD PRESSURE: 114 MMHG

## 2022-10-20 DIAGNOSIS — J01.90 ACUTE SINUSITIS, UNSPECIFIED: ICD-10-CM

## 2022-10-20 PROCEDURE — 99214 OFFICE O/P EST MOD 30 MIN: CPT

## 2022-10-20 RX ORDER — AMOXICILLIN AND CLAVULANATE POTASSIUM 875; 125 MG/1; MG/1
875-125 TABLET, COATED ORAL
Qty: 20 | Refills: 0 | Status: ACTIVE | COMMUNITY
Start: 2022-10-20 | End: 1900-01-01

## 2022-10-30 NOTE — REVIEW OF SYSTEMS
[Fatigue] : fatigue [Postnasal Drip] : postnasal drip [Sinus Problems] : sinus problems [Negative] : Psychiatric [Fever] : no fever [Recent Wt Gain (___ Lbs)] : ~T no recent weight gain [EDS] : no eds [Chills] : no chills [Poor Appetite] : no poor appetite [Recent Wt Loss (___ Lbs)] : ~T no recent weight loss [Dry Eyes] : no dry eyes [Ear Disturbance] : no ear disturbance [Epistaxis] : no epistaxis [Sore Throat] : no sore throat [Eye Irritation] : no eye irritation [Nasal Congestion] : no nasal congestion [Dry Mouth] : no dry mouth [Mouth Ulcers] : no mouth ulcers [Poor Dentition] : no poor dentition [Edentulous] : no edentulous

## 2022-10-30 NOTE — ASSESSMENT
[FreeTextEntry1] : . Lc is a 67 year old male with a history of allergic rhinitis, cough, GERD, snoring, and SOB who now presents to office for sick visit.\par \par His chief complaint is sinus pressure.\par \par 1. Acute Sinuitis\par -add Augmentin 875 mg  BID X 10 days.\par \par 2. Asthma \par - Continue Advair Diskus 250 1 inhalation BID\par - Continue rescue inhaler: Ventolin 2 puffs up to QID\par - Continue treatment of albuterol 1 unit does via a nebulizer up to TID\par \par 3. Allergic Rhinitis \par - Continue Flonase\par - Continue Azelastine \par -continue Allegra\par -continue saline nasal spray\par  \par Patient to follow up with Dr. Garcia as scheduled.\par Patient to call with further questions and concerns.\par Patient verbalizes understanding of care plan and is agreeable.\par

## 2022-10-30 NOTE — HISTORY OF PRESENT ILLNESS
[Former] : former [Never] : never [TextBox_4] : Mr. Platt is a 67 year old male with a history of allergic rhinitis, cough, GERD, snoring, and SOB who now presents to office for sick visit.\par \par His chief complaint is sinus pressure.\par \par Patient reports he had URI in beginning of October 2022. He reports he had COVID test which was negative. Patient reports he is currently complaining of sinus pressure, fatigue and fullness in ear. He reports he is currently taking Allegra, saline nasal spray, Flonase and azelastine nasal spray. \par \par Patient reports he is compliant with asthma inhaler and feels benefit from them\par \par Patient denies fever, chills, SOB @ rest or exertion, cough, wheezing, nasal congestion, runny nose or heartburn/reflux. \par \par

## 2023-01-17 ENCOUNTER — NON-APPOINTMENT (OUTPATIENT)
Age: 68
End: 2023-01-17

## 2023-01-17 ENCOUNTER — APPOINTMENT (OUTPATIENT)
Dept: PULMONOLOGY | Facility: CLINIC | Age: 68
End: 2023-01-17
Payer: MEDICARE

## 2023-01-17 VITALS
RESPIRATION RATE: 16 BRPM | SYSTOLIC BLOOD PRESSURE: 130 MMHG | DIASTOLIC BLOOD PRESSURE: 80 MMHG | HEIGHT: 66 IN | BODY MASS INDEX: 24.11 KG/M2 | TEMPERATURE: 97.2 F | WEIGHT: 150 LBS | OXYGEN SATURATION: 97 % | HEART RATE: 57 BPM

## 2023-01-17 PROCEDURE — 95012 NITRIC OXIDE EXP GAS DETER: CPT

## 2023-01-17 PROCEDURE — 99214 OFFICE O/P EST MOD 30 MIN: CPT | Mod: 25

## 2023-01-17 PROCEDURE — 94010 BREATHING CAPACITY TEST: CPT

## 2023-01-17 RX ORDER — POTASSIUM CITRATE 10 MEQ/1
10 MEQ TABLET, EXTENDED RELEASE ORAL
Qty: 180 | Refills: 0 | Status: ACTIVE | COMMUNITY
Start: 2022-10-24

## 2023-01-17 RX ORDER — FLUTICASONE PROPIONATE 50 UG/1
50 SPRAY, METERED NASAL
Qty: 16 | Refills: 0 | Status: ACTIVE | COMMUNITY
Start: 2022-12-22

## 2023-01-17 NOTE — ASSESSMENT
[FreeTextEntry1] : Mr. Platt is a 67 year old male with a prior history of tongue cancer, asthma, allergy, GERD, Casiano's esophagus, and ? OSAS, His number one issue is post covid-19 4/2022- resolved - stable\par \par The patient's SOB is felt to be multifactorial:\par - Overweight\par - Out-of Shape\par - Poor breathing mechanics\par - Asthma/ Allergy\par \par Problem 1: Asthma (stable) \par - Continue Advair Diskus 250 1 inhalation BID\par - Continue rescue inhaler: Ventolin 2 puffs up to QID\par - Continue treatment of albuterol 1 unit does via a nebulizer up to TID\par -Add Prednisone 20mg for 7 days then 10mg for 7 days \par -Information sheet given to the patient to be reviewed, this medication is never to be used without consulting the prescribing physician. Proper dietary restraint is necessary specifically salt containing foods, if any reaction may occur should be reported. \par Asthma is believed to be caused by inherited (genetic) and environmental factor, but its exact cause is unknown. Asthma may be triggered by allergens, lung infections, or irritants in the air. Asthma triggers are different for each person \par \par Prolem 1A: covid-19 infection 4/2022 -resolved \par Problem: Health Maintenance/COVID19 Precautions \par -Recommend quarantine for 10 days \par - Clean your hands often. Wash your hands often with soap and water for at least 20 seconds, especially after blowing your nose, coughing, or sneezing, or having been in a public place.\par - If soap and water are not available, use a hand  that contains at least 60% alcohol.\par - To the extent possible, avoid touching high-touch surfaces in public places - elevator buttons, door handles, handrails, handshaking with people, etc. Use a tissue or your sleeve to cover your hand or finger if you must touch something.\par - Wash your hands after touching surfaces in public places.\par - Avoid touching your face, nose, eyes, etc.\par - Clean and disinfect your home to remove germs: practice routine cleaning of frequently touched surfaces (for example: tables, doorknobs, light switches, handles, desks, toilets, faucets, sinks & cell phones)\par - Avoid crowds, especially in poorly ventilated spaces. Your risk of exposure to respiratory viruses like COVID-19 may increase in crowded, closed-in settings with little air circulation if there are people in the crowd who are sick. All patients are recommended to practice social distancing and stay at least 6 feet away from others. \par - Avoid all non-essential travel including plane trips, and especially avoid embarking on cruise ships.\par -If COVID-19 is spreading in your community, take extra measures to put distance between yourself and other people to further reduce your risk of being exposed to this new virus.\par -Stay home as much as possible.\par - Consider ways of getting food brought to your house through family, social, or commercial networks\par -Be aware that the virus has been known to live in the air up to 3 hours post exposure, cardboard up to 24 hours post exposure, copper up to 4 hours post exposure, steel and plastic up to 2-3 days post exposure. Risk of transmission from these surfaces are affected by many variables.\par COVID-19 precautionary Immune Support Recommendations:\par -OTC Vitamin C 500mg BID \par -OTC Quercetin 250-500mg BID \par -OTC Zinc 75-100mg per day \par -OTC Melatonin 1 or 2mg a night \par -OTC Vitamin D 1-4000mg per day \par -OTC Tonic Water 8oz per day\par -Water 0.5-1 gallon per day\par Asthma and COVID19:\par You need to make sure your asthma is under control. This often requires the use of inhaled corticosteroids (and sometimes oral corticosteroids). Inhaled corticosteroids do not likely reduce your immune system’s ability to fight infections, but oral corticosteroids may. It is important to use the steps above to protect yourself to limit your exposure to any respiratory virus. \par -Inhaler technique reviewed as well as oral hygiene techniques reviewed with patient. Avoidance of cold air, extremes of temperature, rescue inhaler should be used before exercise. Order of medication reviewed with patient. Recommended use of a cool mist humidifier in the bedroom. \par \par Problem 2: Allergic Rhinitis \par - Continue olopatadine .6% 1 sniff/nostril BID\par - Continue Olopatadine 0.2% eye drops, one drop in each eye BID \par - Recommended Navage Nasal Saline \par - Blood work to include: asthma profile, food IgE panel, eosinophil level, IgE level, Vitamin D level (noncompliant)\par - Environmental measures for allergies were encouraged including mattress and pillow cover, air purifier, and environmental controls.\par \par Problem 3: Reflux/ GERD (Casiano's Esophagus)\par - Continue Omeprazole 40 mg before breakfast\par - Things to avoid including overeating, spicy foods, tight clothing, eating within three hours of bed, this list is not all inclusive. \par -For treatment of reflux, possible options discussed including diet control, H2 blockers, PPIs, as well as coating motility agents discussed as treatment options. Timing of meals and proximity of last meal to sleep were discussed. If symptoms persist, a formal gastrointestinal evaluation is needed.\par \par Problem 4: (+)NORMA- mild NC \par - Based on his mallampatti class, EDS, and Casiano's esophagus, he is recommended to have a home sleep study to r/o for NORMA\par - Blood work to include thyroid function test and Free and Total Testosterone Test\par Sleep apnea is associated with adverse clinical consequences which an affect most organ systems. Cardiovascular disease risk includes arrhythmias, atrial fibrillation, hypertension, coronary artery disease, and stroke. Metabolic disorders include diabetes type 2, non-alcoholic fatty liver disease. Mood disorder especially depression; and cognitive decline especially in the elderly. Associations with chronic reflux/Casiano’s esophagus some but not all inclusive. \par -Reasons include arousal consistent with hypopnea; respiratory events most prominent in REM sleep or supine position; therefore sleep staging and body position are important for accurate diagnosis and estimation of AHI. \par \par Problem 5: Cardiac \par - follow up with cardiologist if needed (Decter) \par \par Problem 6: Poor Breathing Mechanics \par -Recommended Wim Hof and Buteyko breathing techniques \par - Proper breathing techniques were reviewed with an emphasis of exhalation. Patient instructed to breath in for 1 second and out for four seconds. Patient was encouraged to not talk while walking. \par \par Problem 7: Overweight (improved)\par - Diet improved \par - Weight loss, exercise, and diet control were discussed and are highly encouraged. Treatment options were given such as, aqua therapy, and contacting a nutritionist. Recommended to use the elliptical, stationary bike, less use of treadmill. Mindful eating was explained to the patient Obesity is associated with worsening asthma, shortness of breath, and potential for cardiac disease, diabetes, and other underlying medical conditions. \par \par  Problem 8: Health maintenance \par -Recommend "Sanotize" nasal spray \par - Administered an influenza vaccine 2022\par -recommended strep pneumonia vaccines: Prevnar-13 vaccine, followed by Pneumo vaccine 23 one year following (completed)\par -recommended early intervention for URIs\par -recommended regular osteoporosis evaluations\par -recommended early dermatological evaluations\par -recommended after the age of 50 to the age of 70, colonoscopy every 5 years \par \par f/u in 3-4 months \par pt is encouraged to call or fax the office with any questions or concerns. \par Explained to the pt in full detail with demonstrations how to use the inhalers and inhaler hygiene. \par -Education provided to the PT regarding their visit and conditions.

## 2023-01-17 NOTE — HISTORY OF PRESENT ILLNESS
[FreeTextEntry1] : Mr. Platt is a 67 year old male coming into the office today for a follow up visit with a history of allergic rhinitis, cough, GERD, snoring, and SOB. His chief complaint is \par \par -he notes feeling generally well \par -he notes energy levels are good \par -he notes exercising daily\par -he notes bowels are regular \par -he notes good quality of sleep \par -he notes sleeping for 7-8hrs\par -he notes he takes an afternoon nap\par -he notes working on reducing EtOH consumption\par \par -he denies any headaches, nausea, emesis, fever, chills, sweats, chest pain, chest pressure, coughing, wheezing, palpitations, constipation, diarrhea, vertigo, dysphagia, heartburn, reflux, itchy eyes, itchy ears, leg swelling, leg pain, arthralgias, myalgias, or sour taste in the mouth.

## 2023-01-17 NOTE — ADDENDUM
[FreeTextEntry1] : Documented by BRENT Gastelum acting as a scribe for Dr. Pato Garcia on 01/17/2023 .\par \par All medical record entries made by the Scribe were at my, Dr. Pato Garcia's, direction and personally dictated by me on 01/17/2023. I have reviewed the chart and agree that the record accurately reflects my personal performance of the history, physical exam, assessment and plan. I have also personally directed, reviewed, and agree with the discharge instructions.

## 2023-01-17 NOTE — PROCEDURE
[FreeTextEntry1] : PFT reveals mild obstructive dysfunction , with an FEV1 of  2.22L, which is 78% of predicted, with a normal flow volume loop. \par \par FENO was 21; a normal value being less than 25\par Fractional exhaled nitric oxide (FENO) is regarded as a simple, noninvasive method for assessing eosinophilic airway inflammation. Produced by a variety of cells within the lung, nitric oxide (NO) concentrations are generally low in healthy individuals. However, high concentrations of NO appear to be involved in nonspecific host defense mechanisms and chronic inflammatory diseases such as asthma. The American Thoracic Society (ATS) therefore has recommended using FENO to aid in the diagnosis and monitoring of eosinophilic airway inflammation and asthma, and for identifying steroid responsive individuals whose chronic respiratory symptoms may be caused by airway inflammation.

## 2023-03-13 ENCOUNTER — APPOINTMENT (OUTPATIENT)
Dept: PULMONOLOGY | Facility: CLINIC | Age: 68
End: 2023-03-13
Payer: MEDICARE

## 2023-03-13 PROCEDURE — 99213 OFFICE O/P EST LOW 20 MIN: CPT | Mod: CS,95

## 2023-03-13 RX ORDER — FLUTICASONE PROPIONATE AND SALMETEROL 250; 50 UG/1; UG/1
250-50 POWDER RESPIRATORY (INHALATION)
Qty: 1 | Refills: 5 | Status: DISCONTINUED | COMMUNITY
Start: 2022-03-02 | End: 2023-03-13

## 2023-03-13 RX ORDER — NIRMATRELVIR AND RITONAVIR 300-100 MG
20 X 150 MG & KIT ORAL
Qty: 1 | Refills: 0 | Status: ACTIVE | COMMUNITY
Start: 2023-03-13 | End: 1900-01-01

## 2023-03-13 NOTE — REASON FOR VISIT
[Home] : at home, [unfilled] , at the time of the visit. [Medical Office: ([facility])___] : at the medical office located in  [Patient] : the patient [Follow-Up] : a follow-up visit [Asthma] : asthma

## 2023-03-14 NOTE — HISTORY OF PRESENT ILLNESS
[TextBox_4] : Mr. Platt is a 68 year old male with a history of allergic rhinitis, cough, GERD, snoring, and SOB who now presents via video for COVID infection. \par \par His chief complaint is COVID infection. \par \par Patient reports he tested positive for COVID via home test today.  He reports his symptoms started Friday.  Patient reports his symptoms are dry cough occasionally productive with green phlegm, fatigue, headache, GI upset, chills, nasal congestion, postnasal drip, and body aches.  He reports he is currently taking Mucinex DM, Zyrtec and Allegra.  Patient is not on his inhalers and did not have any nebulizer solutions to start.  He reports he uses Delsym cough syrup for cough with benefit.\par Patient is not on any blood thinners, or statins.\par \par Patient denies fever, shortness of breath at rest or exertion, wheezing, or runny nose.\par \par

## 2023-03-14 NOTE — REVIEW OF SYSTEMS
[Fatigue] : fatigue [Chills] : chills [Nasal Congestion] : nasal congestion [Postnasal Drip] : postnasal drip [Cough] : cough [Sputum] : sputum [GERD] : gerd [Negative] : Psychiatric [Fever] : no fever [Recent Wt Gain (___ Lbs)] : ~T no recent weight gain [EDS] : no eds [Poor Appetite] : no poor appetite [Recent Wt Loss (___ Lbs)] : ~T no recent weight loss [Dry Eyes] : no dry eyes [Ear Disturbance] : no ear disturbance [Epistaxis] : no epistaxis [Sore Throat] : no sore throat [Eye Irritation] : no eye irritation [Dry Mouth] : no dry mouth [Sinus Problems] : no sinus problems [Mouth Ulcers] : no mouth ulcers [Poor Dentition] : no poor dentition [Edentulous] : no edentulous [Hemoptysis] : no hemoptysis [Chest Tightness] : no chest tightness [Frequent URIs] : no frequent URIs [Dyspnea] : no dyspnea [Pleuritic Pain] : no pleuritic pain [Wheezing] : no wheezing [A.M. Dry Mouth] : no a.m. dry mouth [SOB on Exertion] : no sob on exertion [Abdominal Pain] : no abdominal pain [Nausea] : no nausea [Vomiting] : no vomiting [Diarrhea] : no diarrhea [Constipation] : no constipation [Dysphagia] : no dysphagia [Bleeding] : no bleeding [Food Intolerance] : no food intolerance [Hepatic Disease] : no hepatic disease

## 2023-03-14 NOTE — ASSESSMENT
[FreeTextEntry1] : Mr. Platt is a 68 year old male with a history of allergic rhinitis, cough, GERD, snoring, and SOB who now presents via video for COVID infection. \par \par His chief complaint is COVID infection. \par \par 1. COVID - 19 infection\par -add Paxlovid BID X 5 days\par -advised patient to quarantine for 5 days from symptom onset.\par \par 2. Cough\par -r/t viral infection\par -continue Delsym cough syrup \par \par 3. Asthma \par - restart Breo 100 1 inhalation QD: Rinse & gargle after use\par - Continue rescue inhaler: Ventolin 2 puffs up to QID\par -restart treatment of albuterol 1 unit does via a nebulizer up to TID\par \par 4. Allergic Rhinitis/PND\par - add Azelastine nasal spray 2 spray each nostril BID\par - Recommended Navage Nasal Saline \par \par 5. Reflux/ GERD (Casiano's Esophagus)\par - Continue Omeprazole 40 mg before breakfast\par \par Patient to follow up with Dr. Garcia as scheduled.\par Patient to call with further questions and concerns.\par Patient verbalizes understanding of care plan and is agreeable.\par

## 2023-05-17 ENCOUNTER — NON-APPOINTMENT (OUTPATIENT)
Age: 68
End: 2023-05-17

## 2023-05-17 ENCOUNTER — APPOINTMENT (OUTPATIENT)
Dept: PULMONOLOGY | Facility: CLINIC | Age: 68
End: 2023-05-17
Payer: MEDICARE

## 2023-05-17 VITALS
SYSTOLIC BLOOD PRESSURE: 122 MMHG | WEIGHT: 150 LBS | HEIGHT: 66 IN | RESPIRATION RATE: 16 BRPM | DIASTOLIC BLOOD PRESSURE: 64 MMHG | TEMPERATURE: 97.8 F | OXYGEN SATURATION: 98 % | HEART RATE: 54 BPM | BODY MASS INDEX: 24.11 KG/M2

## 2023-05-17 DIAGNOSIS — U07.1 COVID-19: ICD-10-CM

## 2023-05-17 DIAGNOSIS — R06.83 SNORING: ICD-10-CM

## 2023-05-17 DIAGNOSIS — K21.9 GASTRO-ESOPHAGEAL REFLUX DISEASE W/OUT ESOPHAGITIS: ICD-10-CM

## 2023-05-17 DIAGNOSIS — G47.33 OBSTRUCTIVE SLEEP APNEA (ADULT) (PEDIATRIC): ICD-10-CM

## 2023-05-17 DIAGNOSIS — R06.02 SHORTNESS OF BREATH: ICD-10-CM

## 2023-05-17 PROCEDURE — 99214 OFFICE O/P EST MOD 30 MIN: CPT | Mod: 25

## 2023-05-17 PROCEDURE — 94010 BREATHING CAPACITY TEST: CPT

## 2023-05-17 PROCEDURE — 95012 NITRIC OXIDE EXP GAS DETER: CPT

## 2023-05-17 NOTE — PHYSICAL EXAM

## 2023-05-17 NOTE — PROCEDURE
[FreeTextEntry1] : PFT reveals normal flows, with an FEV1 of 2.25 L, which is 80 % of predicted, with a normal flow volume loop.\par PFTs were performed to evaluate for SOB / Asthma \par \par FENO was ; 19 normal value being less than 25 Fractional exhaled nitric oxide (FENO) is regarded as a simple, noninvasive method for assessing eosinophilic airway inflammation. Produced by a variety of cells within the lung, nitric oxide (NO) concentrations are generally low in healthy individuals. However, high concentrations of NO appear to be involved in nonspecific host defense mechanisms and chronic inflammatory diseases such as asthma. The American Thor[employer] Society (ATS) therefore has recommended using FENO to aid in the diagnosis and monitoring of eosinophilic airway inflammation and asthma, and for identifying steroid responsive individuals whose chronic respiratory symptoms may be airway inflammation. \par

## 2023-05-17 NOTE — ADDENDUM
[FreeTextEntry1] : Documented by Nelson Escalante as a scribe for Dr. Pato Garcia on 05/17/2023   \par \par All medical record entries made by the Scribe were at my, Dr. Pato Garcia's, direction and personally dictated by me on 05/17/2023 . I have reviewed the chart and agree that the record accurately reflects my personal performance of the history, physical exam, assessment and plan. I have also personally directed, reviewed, and agree with the discharge instructions.

## 2023-05-17 NOTE — HISTORY OF PRESENT ILLNESS
[FreeTextEntry1] : Mr. Platt is a 68 year old male coming into the office today for a follow up visit with a history of allergic rhinitis, cough, GERD, snoring, and SOB. \par \par -he notes generally feeling well \par -he notes vision is stable \par -he notes his went down to 148\par -he notes he exercises everyday\par -he notes he sleeps 6-8 hours \par -he notes he is going to [place] and then [place] \par -he notes no wheezing or coughing \par -he notes COVID - 19 x4 \par \par -he denies any headaches, nausea, emesis, fever, chills, sweats, chest pain, chest pressure, coughing, wheezing, palpitations, constipation, diarrhea, vertigo, dysphagia, heartburn, reflux, itchy eyes, itchy ears, leg swelling, arthralgias, myalgias, or sour taste in the mouth.

## 2023-05-17 NOTE — ASSESSMENT
[FreeTextEntry1] : Mr. Platt is a 68 year old male with a prior history of tongue cancer, asthma, allergy, GERD, Casiano's esophagus, and ? OSAS, His number one issue is post covid-19 4/2022- resolved (4x)  - stable\par \par The patient's SOB is felt to be multifactorial:\par - Overweight\par - Out-of Shape\par - Poor breathing mechanics\par - Asthma/ Allergy\par \par Problem 1: Asthma (stable) \par - Continue Advair Diskus 250 1 inhalation BID\par - Continue rescue inhaler: Ventolin 2 puffs up to QID\par - Continue treatment of albuterol 1 unit does via a nebulizer up to TID\par -S/p Prednisone 20mg for 7 days then 10mg for 7 days 01/2023\par -Information sheet given to the patient to be reviewed, this medication is never to be used without consulting the prescribing physician. Proper dietary restraint is necessary specifically salt containing foods, if any reaction may occur should be reported. \par Asthma is believed to be caused by inherited (genetic) and environmental factor, but its exact cause is unknown. Asthma may be triggered by allergens, lung infections, or irritants in the air. Asthma triggers are different for each person \par \par Prolem 1A: covid-19 infection 4/2022 -resolved x4 \par Problem: Health Maintenance/COVID19 Precautions \par -Recommend quarantine for 10 days \par - Clean your hands often. Wash your hands often with soap and water for at least 20 seconds, especially after blowing your nose, coughing, or sneezing, or having been in a public place.\par - If soap and water are not available, use a hand  that contains at least 60% alcohol.\par - To the extent possible, avoid touching high-touch surfaces in public places - elevator buttons, door handles, handrails, handshaking with people, etc. Use a tissue or your sleeve to cover your hand or finger if you must touch something.\par - Wash your hands after touching surfaces in public places.\par - Avoid touching your face, nose, eyes, etc.\par - Clean and disinfect your home to remove germs: practice routine cleaning of frequently touched surfaces (for example: tables, doorknobs, light switches, handles, desks, toilets, faucets, sinks & cell phones)\par - Avoid crowds, especially in poorly ventilated spaces. Your risk of exposure to respiratory viruses like COVID-19 may increase in crowded, closed-in settings with little air circulation if there are people in the crowd who are sick. All patients are recommended to practice social distancing and stay at least 6 feet away from others. \par - Avoid all non-essential travel including plane trips, and especially avoid embarking on cruise ships.\par -If COVID-19 is spreading in your community, take extra measures to put distance between yourself and other people to further reduce your risk of being exposed to this new virus.\par -Stay home as much as possible.\par - Consider ways of getting food brought to your house through family, social, or commercial networks\par -Be aware that the virus has been known to live in the air up to 3 hours post exposure, cardboard up to 24 hours post exposure, copper up to 4 hours post exposure, steel and plastic up to 2-3 days post exposure. Risk of transmission from these surfaces are affected by many variables.\par COVID-19 precautionary Immune Support Recommendations:\par -OTC Vitamin C 500mg BID \par -OTC Quercetin 250-500mg BID \par -OTC Zinc 75-100mg per day \par -OTC Melatonin 1 or 2mg a night \par -OTC Vitamin D 1-4000mg per day \par -OTC Tonic Water 8oz per day\par -Water 0.5-1 gallon per day\par Asthma and COVID19:\par You need to make sure your asthma is under control. This often requires the use of inhaled corticosteroids (and sometimes oral corticosteroids). Inhaled corticosteroids do not likely reduce your immune system’s ability to fight infections, but oral corticosteroids may. It is important to use the steps above to protect yourself to limit your exposure to any respiratory virus. \par -Inhaler technique reviewed as well as oral hygiene techniques reviewed with patient. Avoidance of cold air, extremes of temperature, rescue inhaler should be used before exercise. Order of medication reviewed with patient. Recommended use of a cool mist humidifier in the bedroom. \par \par Problem 2: Allergic Rhinitis \par - Continue olopatadine .6% 1 sniff/nostril BID\par - Continue Olopatadine 0.2% eye drops, one drop in each eye BID \par - Recommended Navage Nasal Saline \par - Blood work to include: asthma profile, food IgE panel, eosinophil level, IgE level, Vitamin D level (noncompliant)\par - Environmental measures for allergies were encouraged including mattress and pillow cover, air purifier, and environmental controls.\par \par Problem 3: Reflux/ GERD (Casiano's Esophagus)\par - Continue Omeprazole 40 mg before breakfast\par - Things to avoid including overeating, spicy foods, tight clothing, eating within three hours of bed, this list is not all inclusive. \par -For treatment of reflux, possible options discussed including diet control, H2 blockers, PPIs, as well as coating motility agents discussed as treatment options. Timing of meals and proximity of last meal to sleep were discussed. If symptoms persist, a formal gastrointestinal evaluation is needed.\par \par Problem 4: (+)NORMA- mild NC \par - Based on his mallampatti class, EDS, and Casiano's esophagus, he is recommended to have a home sleep study to r/o for NORMA\par - Blood work to include thyroid function test and Free and Total Testosterone Test\par Sleep apnea is associated with adverse clinical consequences which an affect most organ systems. Cardiovascular disease risk includes arrhythmias, atrial fibrillation, hypertension, coronary artery disease, and stroke. Metabolic disorders include diabetes type 2, non-alcoholic fatty liver disease. Mood disorder especially depression; and cognitive decline especially in the elderly. Associations with chronic reflux/Casiano’s esophagus some but not all inclusive. \par -Reasons include arousal consistent with hypopnea; respiratory events most prominent in REM sleep or supine position; therefore sleep staging and body position are important for accurate diagnosis and estimation of AHI. \par \par Problem 5: Cardiac \par - follow up with cardiologist if needed ([facility]) \par \par Problem 6: Poor Breathing Mechanics \par -Recommended Wim Hof and Buteyko breathing techniques \par - Proper breathing techniques were reviewed with an emphasis of exhalation. Patient instructed to breath in for 1 second and out for four seconds. Patient was encouraged to not talk while walking. \par \par Problem 7: Overweight (improved)\par - Diet improved \par - Weight loss, exercise, and diet control were discussed and are highly encouraged. Treatment options were given such as, aqua therapy, and contacting a nutritionist. Recommended to use the elliptical, stationary bike, less use of treadmill. Mindful eating was explained to the patient Obesity is associated with worsening asthma, shortness of breath, and potential for cardiac disease, diabetes, and other underlying medical conditions. \par \par  Problem 8: Health maintenance \par -Recommend "Sanotize" nasal spray \par - Administered an influenza vaccine 2022\par -recommended strep pneumonia vaccines: Prevnar-13 vaccine, followed by Pneumo vaccine 23 one year following (completed)\par -recommended early intervention for URIs\par -recommended regular osteoporosis evaluations\par -recommended early dermatological evaluations\par -recommended after the age of 50 to the age of 70, colonoscopy every 5 years \par \par f/u in 3-4 months \par pt is encouraged to call or fax the office with any questions or concerns. \par Explained to the pt in full detail with demonstrations how to use the inhalers and inhaler hygiene. \par -Education provided to the PT regarding their visit and conditions.

## 2023-10-17 ENCOUNTER — APPOINTMENT (OUTPATIENT)
Dept: PULMONOLOGY | Facility: CLINIC | Age: 68
End: 2023-10-17

## 2023-11-02 ENCOUNTER — NON-APPOINTMENT (OUTPATIENT)
Age: 68
End: 2023-11-02

## 2024-01-06 ENCOUNTER — NON-APPOINTMENT (OUTPATIENT)
Age: 69
End: 2024-01-06

## 2024-03-18 ENCOUNTER — APPOINTMENT (OUTPATIENT)
Dept: PULMONOLOGY | Facility: CLINIC | Age: 69
End: 2024-03-18
Payer: MEDICARE

## 2024-03-18 VITALS
OXYGEN SATURATION: 98 % | TEMPERATURE: 96.3 F | HEIGHT: 66 IN | BODY MASS INDEX: 23.78 KG/M2 | RESPIRATION RATE: 16 BRPM | HEART RATE: 58 BPM | DIASTOLIC BLOOD PRESSURE: 72 MMHG | WEIGHT: 148 LBS | SYSTOLIC BLOOD PRESSURE: 136 MMHG

## 2024-03-18 DIAGNOSIS — J45.909 UNSPECIFIED ASTHMA, UNCOMPLICATED: ICD-10-CM

## 2024-03-18 DIAGNOSIS — R05.9 COUGH, UNSPECIFIED: ICD-10-CM

## 2024-03-18 DIAGNOSIS — J30.9 ALLERGIC RHINITIS, UNSPECIFIED: ICD-10-CM

## 2024-03-18 PROCEDURE — 99214 OFFICE O/P EST MOD 30 MIN: CPT | Mod: 25

## 2024-03-18 RX ORDER — AZELASTINE HYDROCHLORIDE 137 UG/1
0.1 SPRAY, METERED NASAL TWICE DAILY
Qty: 3 | Refills: 1 | Status: ACTIVE | COMMUNITY
Start: 2022-03-02 | End: 1900-01-01

## 2024-03-18 RX ORDER — ALBUTEROL SULFATE 2.5 MG/3ML
(2.5 MG/3ML) SOLUTION RESPIRATORY (INHALATION)
Qty: 120 | Refills: 3 | Status: ACTIVE | COMMUNITY
Start: 2018-07-24 | End: 1900-01-01

## 2024-03-18 RX ORDER — OLOPATADINE HYDROCHLORIDE 665 UG/1
0.6 SPRAY, METERED NASAL
Qty: 3 | Refills: 1 | Status: ACTIVE | COMMUNITY
Start: 2019-05-13 | End: 1900-01-01

## 2024-03-18 RX ORDER — ALBUTEROL SULFATE 90 UG/1
108 (90 BASE) AEROSOL, METERED RESPIRATORY (INHALATION) EVERY 6 HOURS
Qty: 1 | Refills: 1 | Status: ACTIVE | COMMUNITY
Start: 2018-07-24 | End: 1900-01-01

## 2024-03-18 RX ORDER — FLUTICASONE FUROATE AND VILANTEROL TRIFENATATE 100; 25 UG/1; UG/1
100-25 POWDER RESPIRATORY (INHALATION)
Qty: 1 | Refills: 1 | Status: ACTIVE | COMMUNITY
Start: 2022-03-02 | End: 1900-01-01

## 2024-03-18 NOTE — REASON FOR VISIT
[Follow-Up] : a follow-up visit [Asthma] : asthma [Cough] : cough [TextBox_44] : ANYI LIAO is being seen for a follow-up visit for. allergic rhinitis, asthma, GERD, snoring, and SOB.

## 2024-03-18 NOTE — PHYSICAL EXAM
[No Acute Distress] : no acute distress [Normal Oropharynx] : normal oropharynx [III] : Mallampati Class: III [Normal Appearance] : normal appearance [Supple] : supple [No Neck Mass] : no neck mass [Normal Rate/Rhythm] : normal rate/rhythm [Normal S1, S2] : normal s1, s2 [No Murmurs] : no murmurs [No Rubs] : no rubs [No Gallops] : no gallops [No Resp Distress] : no resp distress [Clear to Auscultation Bilaterally] : clear to auscultation bilaterally [No Abnormalities] : no abnormalities [Benign] : benign [Normal Gait] : normal gait [No Clubbing] : no clubbing [No Cyanosis] : no cyanosis [No Edema] : no edema [FROM] : FROM [Normal Color/ Pigmentation] : normal color/ pigmentation [No Focal Deficits] : no focal deficits [Oriented x3] : oriented x3 [Normal Affect] : normal affect [TextBox_54] : 2/6 systolic murmur  [TextBox_68] : I:E ratio 1:3; clear

## 2024-03-18 NOTE — HISTORY OF PRESENT ILLNESS
[FreeTextEntry1] : Mr. Platt is a 68 year old male coming into the office today for a follow up visit with a history of allergic rhinitis, cough, GERD, snoring, and SOB.   -he notes generally feeling well  -he notes vision is stable  -he notes his went down to 148 -he notes he exercises everyday -he notes he sleeps 6-8 hours  -he notes he is going to [place] and then [place]  -he notes no wheezing or coughing  -he notes COVID - 19 x4   -he denies any headaches, nausea, emesis, fever, chills, sweats, chest pain, chest pressure, coughing, wheezing, palpitations, constipation, diarrhea, vertigo, dysphagia, heartburn, reflux, itchy eyes, itchy ears, leg swelling, arthralgias, myalgias, or sour taste in the mouth.    TODAY'S VISIT 3/18/24 Mr. Platt is a 69 year old male coming into the office today for a follow up visit with a history of allergic rhinitis, cough, GERD, snoring, and SOB.  -reports he went skiing when he returned 1 week ago he's had a cough with clear sputum -reports he has had chest congestion and chest tightness -reports he has been using his nebulizer, allegra D with some relief -reports he has been taking OTC Mucinex and Delsym with relief -reports he has been fatigued  -reports he did an at-home Covid 19 test which was negative   -he denies any headaches, nausea, emesis, fever, chills, sweats, wheezing, palpitations, constipation, diarrhea, vertigo, dysphagia, heartburn, reflux, itchy eyes, itchy ears, leg swelling, arthralgias, myalgias, or sour taste in the mouth.

## 2024-03-18 NOTE — ASSESSMENT
[FreeTextEntry1] : Mr. Platt is a 89year old male with a prior history of tongue cancer, asthma, allergy, GERD, Casiano's esophagus, and OSAS, His number one issue is cough and chest congestion  Problem 1: cough chest congestion -continue OTC Mucinex and Delsym -continue Allegra D  Problem 1a: Asthma - Continue Breo  - Continue rescue inhaler: Ventolin 2 puffs up to QID - Continue treatment of albuterol 1 unit does via a nebulizer up to TID  Proper dietary restraint is necessary specifically salt containing foods, if any reaction may occur should be reported.  Asthma is believed to be caused by inherited (genetic) and environmental factor, but its exact cause is unknown. Asthma may be triggered by allergens, lung infections, or irritants in the air. Asthma triggers are different for each person   Problem 2: Allergic Rhinitis  - Continue olopatadine .6% 1 sniff/nostril BID - renewed - Continue Olopatadine 0.2% eye drops, one drop in each eye BID  - Recommended Navage Nasal Saline  - Blood work to include: asthma profile, food IgE panel, eosinophil level, IgE level, Vitamin D level (noncompliant) - Environmental measures for allergies were encouraged including mattress and pillow cover, air purifier, and environmental controls.  Problem 3: Reflux/ GERD (Casiano's Esophagus) - Continue Omeprazole 40 mg before breakfast - Things to avoid including overeating, spicy foods, tight clothing, eating within three hours of bed, this list is not all inclusive.  -For treatment of reflux, possible options discussed including diet control, H2 blockers, PPIs, as well as coating motility agents discussed as treatment options. Timing of meals and proximity of last meal to sleep were discussed. If symptoms persist, a formal gastrointestinal evaluation is needed.  Problem 4: (+)NORMA- mild NC  - Based on his mallampatti class, EDS, and Casiano's esophagus, he is recommended to have a home sleep study to r/o for NORMA - Blood work to include thyroid function test and Free and Total Testosterone Test Sleep apnea is associated with adverse clinical consequences which an affect most organ systems. Cardiovascular disease risk includes arrhythmias, atrial fibrillation, hypertension, coronary artery disease, and stroke. Metabolic disorders include diabetes type 2, non-alcoholic fatty liver disease. Mood disorder especially depression; and cognitive decline especially in the elderly. Associations with chronic reflux/Casiano's esophagus some but not all inclusive.  -Reasons include arousal consistent with hypopnea; respiratory events most prominent in REM sleep or supine position; therefore sleep staging and body position are important for accurate diagnosis and estimation of AHI.   Problem 5: Cardiac  - follow up with cardiologist if needed ([facility])    Problem 8: Health maintenance  -Recommend "Sanotize" nasal spray  - Administered an influenza vaccine 2022 -recommended strep pneumonia vaccines: Prevnar-13 vaccine, followed by Pneumo vaccine 23 one year following (completed) -recommended early intervention for URIs -recommended regular osteoporosis evaluations -recommended early dermatological evaluations -recommended after the age of 50 to the age of 70, colonoscopy every 5 years   f/u in 3-4 months  pt is encouraged to call or fax the office with any questions or concerns.  -Education provided to the PT regarding their visit and conditions.

## 2024-12-10 ENCOUNTER — NON-APPOINTMENT (OUTPATIENT)
Age: 69
End: 2024-12-10

## 2024-12-24 PROBLEM — F10.90 ALCOHOL USE: Status: ACTIVE | Noted: 2017-01-18

## 2025-01-11 ENCOUNTER — NON-APPOINTMENT (OUTPATIENT)
Age: 70
End: 2025-01-11

## 2025-03-20 ENCOUNTER — NON-APPOINTMENT (OUTPATIENT)
Age: 70
End: 2025-03-20

## 2025-03-20 ENCOUNTER — APPOINTMENT (OUTPATIENT)
Dept: PULMONOLOGY | Facility: CLINIC | Age: 70
End: 2025-03-20
Payer: MEDICARE

## 2025-03-20 VITALS
OXYGEN SATURATION: 91 % | RESPIRATION RATE: 16 BRPM | DIASTOLIC BLOOD PRESSURE: 68 MMHG | WEIGHT: 150 LBS | TEMPERATURE: 97.4 F | HEART RATE: 64 BPM | HEIGHT: 66 IN | SYSTOLIC BLOOD PRESSURE: 114 MMHG | BODY MASS INDEX: 24.11 KG/M2

## 2025-03-20 DIAGNOSIS — K21.9 GASTRO-ESOPHAGEAL REFLUX DISEASE W/OUT ESOPHAGITIS: ICD-10-CM

## 2025-03-20 DIAGNOSIS — J45.909 UNSPECIFIED ASTHMA, UNCOMPLICATED: ICD-10-CM

## 2025-03-20 DIAGNOSIS — G47.33 OBSTRUCTIVE SLEEP APNEA (ADULT) (PEDIATRIC): ICD-10-CM

## 2025-03-20 DIAGNOSIS — J30.9 ALLERGIC RHINITIS, UNSPECIFIED: ICD-10-CM

## 2025-03-20 PROCEDURE — 99214 OFFICE O/P EST MOD 30 MIN: CPT | Mod: 25

## 2025-06-14 ENCOUNTER — NON-APPOINTMENT (OUTPATIENT)
Age: 70
End: 2025-06-14